# Patient Record
Sex: FEMALE | Race: WHITE | NOT HISPANIC OR LATINO | Employment: OTHER | URBAN - METROPOLITAN AREA
[De-identification: names, ages, dates, MRNs, and addresses within clinical notes are randomized per-mention and may not be internally consistent; named-entity substitution may affect disease eponyms.]

---

## 2017-01-20 ENCOUNTER — HOSPITAL ENCOUNTER (OUTPATIENT)
Dept: RADIOLOGY | Facility: MEDICAL CENTER | Age: 62
End: 2017-01-20
Attending: OBSTETRICS & GYNECOLOGY
Payer: COMMERCIAL

## 2017-01-20 DIAGNOSIS — R92.8 ABNORMAL FINDING ON BREAST IMAGING: ICD-10-CM

## 2017-01-20 PROCEDURE — 76642 ULTRASOUND BREAST LIMITED: CPT | Mod: LT

## 2018-01-25 ENCOUNTER — TELEPHONE (OUTPATIENT)
Dept: RADIOLOGY | Facility: MEDICAL CENTER | Age: 63
End: 2018-01-25

## 2018-01-25 NOTE — TELEPHONE ENCOUNTER
SPOKE W/ PT REMINDING PT OF 2 APPTS W/ RBC ON 1/26/18 @ 2:05 & PT WAS TOLD WE HAVE TRIED TO GET SONOCINE ORDER FOR MD BUT WE HAVE NOT RECEIVED THE ORDER. PT STATES SHE WILL TRY TO CALL TO GET ORDER.

## 2018-01-26 ENCOUNTER — HOSPITAL ENCOUNTER (OUTPATIENT)
Dept: RADIOLOGY | Facility: MEDICAL CENTER | Age: 63
End: 2018-01-26
Attending: OBSTETRICS & GYNECOLOGY
Payer: COMMERCIAL

## 2018-01-26 DIAGNOSIS — R92.30 DENSE BREAST TISSUE: ICD-10-CM

## 2018-01-26 DIAGNOSIS — Z12.31 SCREENING MAMMOGRAM, ENCOUNTER FOR: ICD-10-CM

## 2018-01-26 PROCEDURE — 76641 ULTRASOUND BREAST COMPLETE: CPT

## 2018-01-26 PROCEDURE — 77067 SCR MAMMO BI INCL CAD: CPT

## 2018-08-17 ENCOUNTER — OFFICE VISIT (OUTPATIENT)
Dept: MEDICAL GROUP | Facility: MEDICAL CENTER | Age: 63
End: 2018-08-17
Payer: COMMERCIAL

## 2018-08-17 VITALS
BODY MASS INDEX: 19.63 KG/M2 | DIASTOLIC BLOOD PRESSURE: 54 MMHG | WEIGHT: 104 LBS | HEIGHT: 61 IN | HEART RATE: 71 BPM | OXYGEN SATURATION: 97 % | RESPIRATION RATE: 18 BRPM | SYSTOLIC BLOOD PRESSURE: 94 MMHG | TEMPERATURE: 99.1 F

## 2018-08-17 DIAGNOSIS — Z79.890 HORMONE REPLACEMENT THERAPY (HRT): ICD-10-CM

## 2018-08-17 DIAGNOSIS — Z00.00 ANNUAL PHYSICAL EXAM: ICD-10-CM

## 2018-08-17 DIAGNOSIS — M85.852 OSTEOPENIA OF NECKS OF BOTH FEMURS: ICD-10-CM

## 2018-08-17 DIAGNOSIS — Z00.00 PREVENTATIVE HEALTH CARE: ICD-10-CM

## 2018-08-17 DIAGNOSIS — Z80.41 FAMILY HISTORY OF OVARIAN CANCER: ICD-10-CM

## 2018-08-17 DIAGNOSIS — F33.9 EPISODE OF RECURRENT MAJOR DEPRESSIVE DISORDER, UNSPECIFIED DEPRESSION EPISODE SEVERITY (HCC): ICD-10-CM

## 2018-08-17 DIAGNOSIS — R23.2 HOT FLASHES: ICD-10-CM

## 2018-08-17 DIAGNOSIS — M85.851 OSTEOPENIA OF NECKS OF BOTH FEMURS: ICD-10-CM

## 2018-08-17 DIAGNOSIS — M54.2 NECK PAIN: ICD-10-CM

## 2018-08-17 DIAGNOSIS — H40.1130 PRIMARY OPEN ANGLE GLAUCOMA OF BOTH EYES, UNSPECIFIED GLAUCOMA STAGE: ICD-10-CM

## 2018-08-17 PROBLEM — H40.10X0 OPEN-ANGLE GLAUCOMA: Status: ACTIVE | Noted: 2018-08-17

## 2018-08-17 PROCEDURE — 99204 OFFICE O/P NEW MOD 45 MIN: CPT | Mod: 25 | Performed by: FAMILY MEDICINE

## 2018-08-17 RX ORDER — LATANOPROST 50 UG/ML
1 SOLUTION/ DROPS OPHTHALMIC DAILY
Qty: 1 BOTTLE | Refills: 11 | Status: SHIPPED | DISCHARGE
Start: 2018-08-17 | End: 2020-09-30

## 2018-08-17 ASSESSMENT — PATIENT HEALTH QUESTIONNAIRE - PHQ9: CLINICAL INTERPRETATION OF PHQ2 SCORE: 0

## 2018-08-17 NOTE — LETTER
[facility], later [facility]  Jenny Blake M.D.  [address]  [place] [address]  Fax: [phone]   Authorization for Release/Disclosure of   Protected Health Information   Name: ROSA WALKER : 1955 SSN: [id number]   Address: [address] [place] [address]  [place] [address] Phone:    [phone] (home) [phone] (work)   I authorize the entity listed below to release/disclose the PHI below to:   [facility]/Jenny Blake M.D. and Jenny Blake M.D.   Provider or Entity Name:     Address   City, State, Zip   Phone:    Fax:   Reason for request: continuity of care   Information to be released:    [  ] LAST COLONOSCOPY,  including any PATH REPORT and follow-up  [  ] LAST FIT/COLOGUARD RESULT [  ] LAST DEXA  [  ] LAST MAMMOGRAM  [  ] LAST PAP  [  ] LAST LABS [  ] RETINA EXAM REPORT  [  ] IMMUNIZATION RECORDS  [  ] Release all info      [  ] Check here and initial the line next to each item to release ALL health information INCLUDING  _____ Care and treatment for drug and / or alcohol abuse  _____ HIV testing, infection status, or AIDS  _____ Genetic Testing    DATES OF SERVICE OR TIME PERIOD TO BE DISCLOSED: _____________  I understand and acknowledge that:  * This Authorization may be revoked at any time by you in writing, except if your health information has already been used or disclosed.  * Your health information that will be used or disclosed as a result of you signing this authorization could be re-disclosed by the recipient. If this occurs, your re-disclosed health information may no longer be protected by State or Federal laws.  * You may refuse to sign this Authorization. Your refusal will not affect your ability to obtain treatment.  * This Authorization becomes effective upon signing and will  on (date) __________.      If no date is indicated, this Authorization will  one (1) year from the signature date.    Name: Rosa Walker    Signature:   Date:      8/17/2018       PLEASE FAX REQUESTED RECORDS BACK TO: [phone]

## 2018-08-17 NOTE — ASSESSMENT & PLAN NOTE
Preventative health care counseling discussed   up to date mammo colonoscopy dexa   Will do labs too

## 2018-08-17 NOTE — PROGRESS NOTES
This medical record contains text that has been entered with the assistance of computer voice recognition and dictation software.  Therefore, it may contain unintended errors in text, spelling, punctuation, or grammar        Chief Complaint   Patient presents with   • Establish Care   • Depression     Refill on medications       Rosa Walker is a 63 y.o. female here evaluation and management of: est care annual physical and labs as well as some long term issues, chronic neck pain well controlled no radiculopathy, depression well controled trintellex for 1-2 years, HRT well controlled on HRT and glaucoma well controlled on latanoprost and lastly family history ovarian cancer mom and questions        Current Outpatient Prescriptions   Medication Sig Dispense Refill   • Vortioxetine HBr (TRINTELLIX) 10 MG Tab Take 1 Tab by mouth every day. 90 Tab 3   • latanoprost (XALATAN) 0.005 % Solution Place 1 Drop in both eyes every day. 1 Bottle 11   • estradiol-levonorgestrel (CLIMARAPRO) 0.045-0.015 MG/DAY patch Apply 1 Patch to skin as directed every 7 days. 12 Patch 3     No current facility-administered medications for this visit.      Patient Active Problem List    Diagnosis Date Noted   • Open-angle glaucoma 08/17/2018   • Osteopenia of necks of both femurs 08/17/2018   • Annual physical exam 08/17/2018   • Neck pain 10/02/2014   • Family history of ovarian cancer 10/02/2014   • Depression 07/16/2013   • Hot flashes 07/16/2013     Past Surgical History:   Procedure Laterality Date   • CATARACT PHACO WITH IOL  5/7/2012    Performed by GENO ARREDONDO at [facility] [place] [facility]   • CATARACT PHACO WITH IOL  4/16/2012    Performed by GENO ARREDONDO at [facility] [place] [place] [facility]   • KNEE ARTHROSCOPY  9/9/2010    Performed by FLORI CHILDRESS at [facility] ORS   • MENISCECTOMY  9/9/2010    Performed by FLORI CHILDRESS at [facility] ORS   • OTHER SURGICAL PROCEDURE  6/2005    Full thickness  "skin graft left politeal fossa   • RETINAL DETACHMENT REPAIR  3/1992    left    • EYE SURGERY        Social History   Substance Use Topics   • Smoking status: Former Smoker     Packs/day: 0.50     Years: 10.00     Types: Cigarettes     Quit date: 1/1/1985   • Smokeless tobacco: Never Used   • Alcohol use 3.5 oz/week     7 Glasses of wine per week     Family History   Problem Relation Age of Onset   • Cancer Mother 74        ovarian   • Other Father         sepsis           ROS  No n/v  all review of system completed and negative except for those listed above     Objective:     Blood pressure (!) 94/54, pulse 71, temperature 37.3 °C (99.1 °F), resp. rate 18, height 1.549 m (5' 1\"), weight 47.2 kg (104 lb), SpO2 97 %. Body mass index is 19.65 kg/m².  Physical Exam:    Constitutional: Alert, no distress.  Skin: Warm, dry, good turgor, no rashes in visible areas.  Eye: Equal, round and reactive, conjunctiva clear, lids normal.  ENMT: Lips without lesions, good dentition, oropharynx clear.  Neck: Trachea midline, no masses, no thyromegaly. No cervical or supraclavicular lymphadenopathy.  Respiratory: Unlabored respiratory effort, lungs clear to auscultation, no wheezes, no ronchi.  Cardiovascular: Normal S1, S2, no murmur, no edema.  Abdomen: Soft, non-tender, no masses, no hepatosplenomegaly.  Psych: Alert and oriented x3, normal affect and mood.              Assessment and Plan:   The following treatment plan was discussed        Problem List Items Addressed This Visit     Depression    Relevant Medications    Vortioxetine HBr (TRINTELLIX) 10 MG Tab    Hot flashes     Well controlled on climarapro patch daily   Risk benefit side effect discussed                  Neck pain     Has had injections in the past but doing well currently       Of note improved after starting trintellix             Family history of ovarian cancer     Did the BRACA testing negative   I discuss COLOR  I also give her a genetic screening " radha and offer to refer to genetic counselor               Open-angle glaucoma     Has opthalmologist Dr. AMIN also takes xalatan             Relevant Medications    latanoprost (XALATAN) 0.005 % Solution    Osteopenia of necks of both femurs     She is on HRT  She states her T score was >-1.5 so not quite a candidate for fossamax               Annual physical exam     Preventative health care counseling discussed   up to date mammo colonoscopy dexa   Will do labs too                 Other Visit Diagnoses     Hormone replacement therapy (HRT)        Relevant Medications    estradiol-levonorgestrel (CLIMARAPRO) 0.045-0.015 MG/DAY patch                Instructed to follow up if symptoms worsen or fail to improve, ER/UC precautions discussed as well    Jenny Blake MD  [facility], Family Medicine   [address] [place] [address]   [place] [address]  Phone: [phone]

## 2018-08-17 NOTE — ASSESSMENT & PLAN NOTE
Did the BRACA testing negative   I discuss COLOR  I also give her a genetic screening questionairre and offer to refer to genetic counselor

## 2018-11-26 DIAGNOSIS — Z79.890 HORMONE REPLACEMENT THERAPY (HRT): ICD-10-CM

## 2018-11-26 DIAGNOSIS — F33.9 EPISODE OF RECURRENT MAJOR DEPRESSIVE DISORDER, UNSPECIFIED DEPRESSION EPISODE SEVERITY (HCC): ICD-10-CM

## 2019-02-06 ENCOUNTER — TELEPHONE (OUTPATIENT)
Dept: MEDICAL GROUP | Facility: MEDICAL CENTER | Age: 64
End: 2019-02-06

## 2019-02-06 NOTE — TELEPHONE ENCOUNTER
FINAL PRIOR AUTHORIZATION STATUS:    1.  Name of Medication & Dose: CLIMARA PRO 0.045-0.015 MG      2. Prior Auth Status: Approved through 02/06/2020     3. Action Taken: Pharmacy Notified: yes Patient Notified: N\A

## 2019-03-20 ENCOUNTER — HOSPITAL ENCOUNTER (OUTPATIENT)
Dept: RADIOLOGY | Facility: MEDICAL CENTER | Age: 64
End: 2019-03-20
Attending: OBSTETRICS & GYNECOLOGY
Payer: COMMERCIAL

## 2019-03-20 DIAGNOSIS — R92.30 DENSE BREAST TISSUE: ICD-10-CM

## 2019-03-20 DIAGNOSIS — Z12.31 VISIT FOR SCREENING MAMMOGRAM: ICD-10-CM

## 2019-03-20 PROCEDURE — 77063 BREAST TOMOSYNTHESIS BI: CPT

## 2019-03-20 PROCEDURE — 76641 ULTRASOUND BREAST COMPLETE: CPT

## 2019-08-20 DIAGNOSIS — F33.9 EPISODE OF RECURRENT MAJOR DEPRESSIVE DISORDER, UNSPECIFIED DEPRESSION EPISODE SEVERITY (HCC): ICD-10-CM

## 2019-08-21 ENCOUNTER — TELEPHONE (OUTPATIENT)
Dept: MEDICAL GROUP | Facility: MEDICAL CENTER | Age: 64
End: 2019-08-21

## 2019-08-21 RX ORDER — VORTIOXETINE 10 MG/1
TABLET, FILM COATED ORAL
Qty: 90 TAB | Refills: 0 | Status: SHIPPED | OUTPATIENT
Start: 2019-08-21 | End: 2020-09-30

## 2019-08-21 NOTE — TELEPHONE ENCOUNTER
MEDICATION PRIOR AUTHORIZATION NEEDED:    1. Name of Medication: Trintellix 10 mg    2. Requested By (Name of Pharmacy): [facility]     3. Is insurance on file current? Yes    4. What is the name & phone number of the 3rd party payor? [phone]    PAR started 08/21/2019

## 2019-08-30 NOTE — TELEPHONE ENCOUNTER
FINAL PRIOR AUTHORIZATION STATUS:    1.  Name of Medication & Dose: Trintellix 10 mg tab     2. Prior Auth Status: Approved through 08/20/2020     3. Action Taken: Pharmacy Notified: yes Patient Notified: N\A

## 2019-12-24 ENCOUNTER — APPOINTMENT (RX ONLY)
Dept: URBAN - METROPOLITAN AREA CLINIC 22 | Facility: CLINIC | Age: 64
Setting detail: DERMATOLOGY
End: 2019-12-24

## 2019-12-24 DIAGNOSIS — L57.0 ACTINIC KERATOSIS: ICD-10-CM

## 2019-12-24 PROBLEM — D48.5 NEOPLASM OF UNCERTAIN BEHAVIOR OF SKIN: Status: ACTIVE | Noted: 2019-12-24

## 2019-12-24 PROCEDURE — 17003 DESTRUCT PREMALG LES 2-14: CPT

## 2019-12-24 PROCEDURE — 17000 DESTRUCT PREMALG LESION: CPT | Mod: 59

## 2019-12-24 PROCEDURE — 11102 TANGNTL BX SKIN SINGLE LES: CPT

## 2019-12-24 PROCEDURE — ? BIOPSY BY SHAVE METHOD

## 2019-12-24 PROCEDURE — ? LIQUID NITROGEN

## 2019-12-24 ASSESSMENT — LOCATION DETAILED DESCRIPTION DERM
LOCATION DETAILED: LEFT MEDIAL SUPERIOR CHEST
LOCATION DETAILED: LEFT INFERIOR LATERAL FOREHEAD
LOCATION DETAILED: LEFT CENTRAL FRONTAL SCALP
LOCATION DETAILED: RIGHT MEDIAL SUPERIOR CHEST
LOCATION DETAILED: RIGHT CENTRAL FRONTAL SCALP

## 2019-12-24 ASSESSMENT — LOCATION ZONE DERM
LOCATION ZONE: FACE
LOCATION ZONE: SCALP
LOCATION ZONE: TRUNK

## 2019-12-24 ASSESSMENT — LOCATION SIMPLE DESCRIPTION DERM
LOCATION SIMPLE: CHEST
LOCATION SIMPLE: RIGHT SCALP
LOCATION SIMPLE: LEFT FOREHEAD
LOCATION SIMPLE: LEFT SCALP

## 2019-12-24 NOTE — PROCEDURE: BIOPSY BY SHAVE METHOD
Destruction After The Procedure: No
Size Of Lesion In Cm: 1.1
Lab: 253
Anesthesia Type: 1% lidocaine with epinephrine and a 1:10 solution of 8.4% sodium bicarbonate
Billing Type: Third-Party Bill
Cryotherapy Text: The wound bed was treated with cryotherapy after the biopsy was performed.
Depth Of Biopsy: dermis
Post-Care Instructions: I reviewed with the patient in detail post-care instructions. Patient is to keep the biopsy site dry overnight, and then apply bacitracin twice daily until healed. Patient may apply hydrogen peroxide soaks to remove any crusting.
Additional Anesthesia Volume In Cc (Will Not Render If 0): 0
Biopsy Type: H and E
Notification Instructions: Patient will be notified of biopsy results. However, patient instructed to call the office if not contacted within 2 weeks.
Lab Facility: 
Wound Care: Petrolatum
Electrodesiccation Text: The wound bed was treated with electrodesiccation after the biopsy was performed.
Consent: Written consent was obtained and risks were reviewed including but not limited to scarring, infection, bleeding, scabbing, incomplete removal, nerve damage and allergy to anesthesia.
Type Of Destruction Used: Curettage
Anesthesia Volume In Cc: 1
Dressing: Band-Aid
Electrodesiccation And Curettage Text: The wound bed was treated with electrodesiccation and curettage after the biopsy was performed.
Was A Bandage Applied: Yes
Silver Nitrate Text: The wound bed was treated with silver nitrate after the biopsy was performed.
Detail Level: Detailed
Biopsy Method: Personna blade
Hemostasis: Pressure and suture ligation
Curettage Text: The wound bed was treated with curettage after the biopsy was performed.

## 2019-12-24 NOTE — PROCEDURE: LIQUID NITROGEN
Render Post-Care Instructions In Note?: yes
Detail Level: Detailed
Duration Of Freeze Thaw-Cycle (Seconds): 5
Post-Care Instructions: I reviewed with the patient in detail post-care instructions. Patient is to wear sunprotection, and avoid picking at any of the treated lesions. Pt may apply Vaseline to crusted or scabbing areas.
Number Of Freeze-Thaw Cycles: 1 freeze-thaw cycle
Render Note In Bullet Format When Appropriate: No
Consent: The patient's consent was obtained including but not limited to risks of crusting, scabbing, blistering, scarring, darker or lighter pigmentary change, recurrence, incomplete removal and infection.

## 2020-05-12 ENCOUNTER — TELEMEDICINE (OUTPATIENT)
Dept: MEDICAL GROUP | Facility: MEDICAL CENTER | Age: 65
End: 2020-05-12
Payer: COMMERCIAL

## 2020-05-12 VITALS
HEIGHT: 66 IN | BODY MASS INDEX: 17.19 KG/M2 | WEIGHT: 107 LBS | SYSTOLIC BLOOD PRESSURE: 110 MMHG | DIASTOLIC BLOOD PRESSURE: 60 MMHG | HEART RATE: 70 BPM

## 2020-05-12 DIAGNOSIS — N95.2 ATROPHIC VAGINITIS: ICD-10-CM

## 2020-05-12 DIAGNOSIS — F32.A DEPRESSION, UNSPECIFIED DEPRESSION TYPE: ICD-10-CM

## 2020-05-12 DIAGNOSIS — Z80.41 FAMILY HISTORY OF MALIGNANT NEOPLASM OF OVARY: ICD-10-CM

## 2020-05-12 PROCEDURE — 99214 OFFICE O/P EST MOD 30 MIN: CPT | Mod: 95,CR | Performed by: FAMILY MEDICINE

## 2020-05-12 RX ORDER — PRASTERONE 6.5 MG/1
6.5 INSERT VAGINAL PRN
COMMUNITY

## 2020-05-12 RX ORDER — VORTIOXETINE 5 MG/1
1 TABLET, FILM COATED ORAL DAILY
Qty: 90 TAB | Refills: 3 | Status: SHIPPED | OUTPATIENT
Start: 2020-05-12 | End: 2021-05-22

## 2020-05-12 SDOH — HEALTH STABILITY: MENTAL HEALTH: HOW OFTEN DO YOU HAVE A DRINK CONTAINING ALCOHOL?: 4 OR MORE TIMES A WEEK

## 2020-05-12 ASSESSMENT — PATIENT HEALTH QUESTIONNAIRE - PHQ9: CLINICAL INTERPRETATION OF PHQ2 SCORE: 0

## 2020-05-12 NOTE — LETTER
[facility]  Jenny Blake M.D.  [address]  [facility] [address]  Fax: [phone]   Authorization for Release/Disclosure of   Protected Health Information   Name: WATSON PANTOJA : 1955 SSN: [id number]   Address: [address] [place] [address] Phone:    [phone] (home) [phone] (work)   I authorize the entity listed below to release/disclose the PHI below to:   [facility]/Jenny Blake M.D. and Jenny Blake M.D.   Provider or Entity Name:  [facility]   Address   [facility], Zip  [address] [place] [address] Phone:  [phone]    Fax:  [phone]   Reason for request: continuity of care   Information to be released:    [xx] LASTCOLONOSCOPY,  including any PATH REPORT and follow-up  [  ] LAST FIT/COLOGUARD RESULT [  ] LAST DEXA  [  ] LAST MAMMOGRAM  [  ] LAST PAP  [  ] LAST LABS [  ] RETINA EXAM REPORT  [  ] IMMUNIZATION RECORDS  [  ] Release all info      [  ] Check here and initial the line next to each item to release ALL health information INCLUDING  _____ Care and treatment for drug and / or alcohol abuse  _____ HIV testing, infection status, or AIDS  _____ Genetic Testing    DATES OF SERVICE OR TIME PERIOD TO BE DISCLOSED: _____________  I understand and acknowledge that:  * This Authorization may be revoked at any time by you in writing, except if your health information has already been used or disclosed.  * Your health information that will be used or disclosed as a result of you signing this authorization could be re-disclosed by the recipient. If this occurs, your re-disclosed health information may no longer be protected by State or Federal laws.  * You may refuse to sign this Authorization. Your refusal will not affect your ability to obtain treatment.  * This Authorization becomes effective upon signing and will  on (date) __________.      If no date is indicated, this Authorization will  one (1) year from the signature  date.    Name: Rosa Walker    Signature: Continuation of Care   Date:     5/12/2020       PLEASE FAX REQUESTED RECORDS BACK TO: [phone]

## 2020-05-13 NOTE — ASSESSMENT & PLAN NOTE
Her mother passed from ovarian cancer when Rosa was 48 and had 5 kids and it was very difficult for her   She did genetic screening   Did baseline pelvic US   Does annual pelvic exam non pap

## 2020-05-13 NOTE — ASSESSMENT & PLAN NOTE
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

## 2020-05-13 NOTE — PROGRESS NOTES
Telemedicine Visit: Established Patient     This encounter was conducted via Zoom .   Verbal consent was obtained. Patient's identity was verified.    Subjective:   CC: follow up depression     Rosa Walker is a 64 y.o. female presenting for evaluation and management of:    Follow up depression   Refill trintillex   Would like to go down to 5mg     ROS   Denies any recent fevers or chills. No nausea or vomiting. No chest pains or shortness of breath.     Allergies   Allergen Reactions   • Nkda [No Known Drug Allergy]        Current medicines (including changes today)  Current Outpatient Medications   Medication Sig Dispense Refill   • Prasterone (INTRAROSA) 6.5 MG INSERT Insert 6.5 mg in vagina.     • Vortioxetine HBr (TRINTELLIX) 5 MG Tab Take 1 Tab by mouth every day. 90 Tab 3   • TRINTELLIX 10 MG Tab TAKE ONE TABLET BY MOUTH EVERY DAY 90 Tab 0   • estradiol-levonorgestrel (CLIMARAPRO) 0.045-0.015 MG/DAY patch Apply 1 Patch to skin as directed every 7 days. 12 Patch 3   • latanoprost (XALATAN) 0.005 % Solution Place 1 Drop in both eyes every day. 1 Bottle 11   • Vortioxetine HBr (TRINTELLIX) 10 MG Tab Take 1 Tab by mouth every day. (Patient not taking: Reported on 5/12/2020) 90 Tab 3     No current facility-administered medications for this visit.        Patient Active Problem List    Diagnosis Date Noted   • Atrophic vaginitis 05/12/2020   • Open-angle glaucoma 08/17/2018   • Osteopenia of necks of both femurs 08/17/2018   • Annual physical exam 08/17/2018   • Neck pain 10/02/2014   • Family history of ovarian cancer 10/02/2014   • Depression 07/16/2013   • Hot flashes 07/16/2013       Family History   Problem Relation Age of Onset   • Cancer Mother 74        ovarian   • Other Father         sepsis       She  has a past medical history of Arthritis, Depression (7/16/2013), Hot flashes (7/16/2013), and Psychiatric problem.  She  has a past surgical history that includes retinal detachment repair  "(3/1992); other surgical procedure (6/2005); knee arthroscopy (9/9/2010); meniscectomy (9/9/2010); cataract phaco with iol (4/16/2012); cataract phaco with iol (5/7/2012); and eye surgery.       Objective:   Vitals obtained by patient:  Encounter Vitals  Standard Vitals  Vitals  Blood Pressure : 110/60(pt stated)  Pulse: 70(pt stated)  Height: 167.6 cm (5' 6\")(pt stated)  Weight: 48.5 kg (107 lb)(pt stated)  Encounter Vitals  Blood Pressure : 110/60(pt stated)  Pulse: 70(pt stated)  Weight: 48.5 kg (107 lb)(pt stated)  Height: 167.6 cm (5' 6\")(pt stated)  BMI (Calculated): 17.27  Pulmonary-Specific Vitals     Durable Medical Equipment-Specific Vitals               Physical Exam:  Constitutional: Alert, no distress, well-groomed.  Skin: No rashes in visible areas.  Eye: Round. Conjunctiva clear, lids normal. No icterus.   ENMT: Lips pink without lesions, good dentition, moist mucous membranes. Phonation normal.  Neck: No masses, no thyromegaly. Moves freely without pain.  CV: Pulse as reported by patient  Respiratory: Unlabored respiratory effort, no cough or audible wheeze  Psych: Alert and oriented x3, normal affect and mood.       Assessment and Plan:   The following treatment plan was discussed:     1. Depression, unspecified depression type  - Vortioxetine HBr (TRINTELLIX) 5 MG Tab; Take 1 Tab by mouth every day.  Dispense: 90 Tab; Refill: 3    2. Family history of ovarian cancer    3. Atrophic vaginitis    Other orders  - Prasterone (INTRAROSA) 6.5 MG INSERT; Insert 6.5 mg in vagina.  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   She has 5 children             Relevant Medications    Vortioxetine HBr (TRINTELLIX) 5 MG Tab    " Family history of ovarian cancer     Her mother passed from ovarian cancer when Rosa was 48 and had 5 kids and it was very difficult for her   She did genetic screening   Did baseline pelvic US   Does annual pelvic exam non pap            Atrophic vaginitis     Using prasterone  Doing HRT                Over 25 minutes spent with patient face to face, greater than 50% time spent with plan/coordination of care regarding that which is discussed in the HPI and A&P        Follow-up: No follow-ups on file.  Jenny Blake M.D.

## 2020-06-12 ENCOUNTER — HOSPITAL ENCOUNTER (OUTPATIENT)
Dept: RADIOLOGY | Facility: MEDICAL CENTER | Age: 65
End: 2020-06-12
Attending: OBSTETRICS & GYNECOLOGY
Payer: COMMERCIAL

## 2020-06-12 DIAGNOSIS — Z12.31 VISIT FOR SCREENING MAMMOGRAM: ICD-10-CM

## 2020-06-12 PROCEDURE — 77067 SCR MAMMO BI INCL CAD: CPT

## 2020-08-11 ENCOUNTER — TELEPHONE (OUTPATIENT)
Dept: MEDICAL GROUP | Facility: MEDICAL CENTER | Age: 65
End: 2020-08-11

## 2020-08-11 NOTE — TELEPHONE ENCOUNTER
Prior Auth for Trintellix 5mg tabs rejected. Recommended alternatives: Citalopram Hydrobromide, Fluoxetine HCl, Sertraline HCl, Venlafaxine HCl ER, Escitalopram Oxalate, Paroxetine HCl, Duloxetine HCl.    Please advise

## 2020-09-30 ENCOUNTER — OFFICE VISIT (OUTPATIENT)
Dept: MEDICAL GROUP | Facility: MEDICAL CENTER | Age: 65
End: 2020-09-30
Payer: COMMERCIAL

## 2020-09-30 VITALS
BODY MASS INDEX: 16.65 KG/M2 | WEIGHT: 103.62 LBS | HEART RATE: 60 BPM | TEMPERATURE: 97.8 F | SYSTOLIC BLOOD PRESSURE: 100 MMHG | HEIGHT: 66 IN | RESPIRATION RATE: 16 BRPM | DIASTOLIC BLOOD PRESSURE: 50 MMHG | OXYGEN SATURATION: 93 %

## 2020-09-30 DIAGNOSIS — G43.809 OTHER MIGRAINE WITHOUT STATUS MIGRAINOSUS, NOT INTRACTABLE: ICD-10-CM

## 2020-09-30 PROBLEM — G43.909 MIGRAINE: Status: ACTIVE | Noted: 2020-09-30

## 2020-09-30 PROCEDURE — 99214 OFFICE O/P EST MOD 30 MIN: CPT | Performed by: FAMILY MEDICINE

## 2020-09-30 RX ORDER — SUMATRIPTAN 100 MG/1
100 TABLET, FILM COATED ORAL
Qty: 10 TAB | Refills: 3 | Status: SHIPPED
Start: 2020-09-30 | End: 2020-11-12

## 2020-09-30 ASSESSMENT — PATIENT HEALTH QUESTIONNAIRE - PHQ9
7. TROUBLE CONCENTRATING ON THINGS, SUCH AS READING THE NEWSPAPER OR WATCHING TELEVISION: NOT AT ALL
6. FEELING BAD ABOUT YOURSELF - OR THAT YOU ARE A FAILURE OR HAVE LET YOURSELF OR YOUR FAMILY DOWN: NOT AL ALL
2. FEELING DOWN, DEPRESSED, IRRITABLE, OR HOPELESS: NOT AT ALL
1. LITTLE INTEREST OR PLEASURE IN DOING THINGS: NOT AT ALL
8. MOVING OR SPEAKING SO SLOWLY THAT OTHER PEOPLE COULD HAVE NOTICED. OR THE OPPOSITE, BEING SO FIGETY OR RESTLESS THAT YOU HAVE BEEN MOVING AROUND A LOT MORE THAN USUAL: NOT AT ALL
5. POOR APPETITE OR OVEREATING: NOT AT ALL
SUM OF ALL RESPONSES TO PHQ QUESTIONS 1-9: 0
3. TROUBLE FALLING OR STAYING ASLEEP OR SLEEPING TOO MUCH: NOT AT ALL
9. THOUGHTS THAT YOU WOULD BE BETTER OFF DEAD, OR OF HURTING YOURSELF: NOT AT ALL
SUM OF ALL RESPONSES TO PHQ9 QUESTIONS 1 AND 2: 0
4. FEELING TIRED OR HAVING LITTLE ENERGY: NOT AT ALL

## 2020-09-30 NOTE — PROGRESS NOTES
"This medical record contains text that has been entered with the assistance of computer voice recognition and dictation software.  Therefore, it may contain unintended errors in text, spelling, punctuation, or grammar        Chief Complaint   Patient presents with   • Migraine     chronic, pt tried various meds       Rosa Walker is a 65 y.o. female here evaluation and management of:    New to me issue chronic migraines interested in rx for aimovig    She is RN at [employer]     Of note she is not wearing her mask when I enter the room, I ask her to replace mask consistent with our office policy and she does so but explains \"at [employer] we can take our masks off \"    Current Outpatient Medications   Medication Sig Dispense Refill   • sumatriptan (IMITREX) 100 MG tablet Take 1 Tab by mouth Once PRN for Migraine for up to 1 dose. 10 Tab 3   • Prasterone (INTRAROSA) 6.5 MG INSERT Insert 6.5 mg in vagina.     • Vortioxetine HBr (TRINTELLIX) 5 MG Tab Take 1 Tab by mouth every day. 90 Tab 3   • estradiol-levonorgestrel (CLIMARAPRO) 0.045-0.015 MG/DAY patch Apply 1 Patch to skin as directed every 7 days. 12 Patch 3     No current facility-administered medications for this visit.      Patient Active Problem List    Diagnosis Date Noted   • Migraine 09/30/2020   • Atrophic vaginitis 05/12/2020   • Open-angle glaucoma 08/17/2018   • Osteopenia of necks of both femurs 08/17/2018   • Annual physical exam 08/17/2018   • Neck pain 10/02/2014   • Family history of ovarian cancer 10/02/2014   • Depression 07/16/2013   • Hot flashes 07/16/2013     Past Surgical History:   Procedure Laterality Date   • CATARACT PHACO WITH IOL  5/7/2012    Performed by GENO ARREDONDO at [facility]ERY [facility]   • CATARACT PHACO WITH IOL  4/16/2012    Performed by GENO ARREDONDO at [facility]   • KNEE ARTHROSCOPY  9/9/2010    Performed by FLORI CHILDRESS at [facility]S   • MENISCECTOMY  9/9/2010    " "Performed by FLORI CHILDRESS at SURGERY [facility] ORS   • OTHER SURGICAL PROCEDURE  2005    Full thickness skin graft left politeal fossa   • RETINAL DETACHMENT REPAIR  3/1992    left    • EYE SURGERY        Social History     Tobacco Use   • Smoking status: Former Smoker     Packs/day: 0.50     Years: 10.00     Pack years: 5.00     Types: Cigarettes     Quit date: 1985     Years since quittin.7   • Smokeless tobacco: Never Used   Substance Use Topics   • Alcohol use: Yes     Alcohol/week: 3.0 oz     Types: 5 Glasses of wine per week     Frequency: 4 or more times a week     Comment: 5 glass wine per week   • Drug use: No     Family History   Problem Relation Age of Onset   • Cancer Mother 74        ovarian   • Other Father         sepsis           ROS  No n/v  all review of system completed and negative except for those listed above     Objective:     /50 (BP Location: Left arm, Patient Position: Sitting, BP Cuff Size: Adult)   Pulse 60   Temp 36.6 °C (97.8 °F) (Temporal)   Resp 16   Ht 1.676 m (5' 6\")   Wt 47 kg (103 lb 9.9 oz)   SpO2 93%  Body mass index is 16.72 kg/m².  Physical Exam:        GEN: comfortable, alert and oriented, well nourished, well developed, in no apparent distress   HEENT: NCAT, eyes: pupils equal and reactive, sclera white, EOMIT, good dentition  HEART: limbs warm and well perfused, regular rate, no JVD, no lower extremity edema  LUNGS: speaking in full sentences, not in apparent respiratory distress, no audible wheezes  MSK: normal tone and bulk, no swelling of the joints, gait steady and normal         Assessment and Plan:   The following treatment plan was discussed        Problem List Items Addressed This Visit     Migraine     This is a new issue to me   She states that she started having migraines about 20 years ago at ae 45 premenopausal but after childbirth   Describes as frontal unilateral headache with nausea  3-4 per month on average, 6+ during a bad " "month, will last 3 days   whats been concerning to her is that past couple of months \"perhaps smoke is trigger\" she has been having 6 + per month, and associated with vision change.  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[employer])       1) I offer brain imaging (mri) as she was atypical age for presentation of migraine and having new worsening features currently - she declines   2) I offer ppx medicatoin such as bb, TCA, topimax she declines  - feels that she would not enjoy side effect   3) I  her that while HRT is not a contraindication to migraines, the exogenous estrogen may be worsening her severity and frequency and she may observe migraine improvement in the absence of HRT  - she is not interested in weaning at this point     She is asking if I am comfortable rx-ing Nurtec or aimovig, she has experimented with samples and has found these to be beneficial.  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  Instead I offer neurology consultation.  I also offer rx for imitrex for abortive therapy       Over 25 minutes spent with patient face to face, greater than 50% time spent with plan/coordination of care regarding that which is discussed in the HPI and A&P           Relevant Medications    sumatriptan (IMITREX) 100 MG tablet    Other Relevant Orders    REFERRAL TO NEUROLOGY                Instructed to follow up if symptoms worsen or fail to improve, ER/UC precautions discussed as well    Jenny Blake MD  [facility], Family Medicine   [address] [place] [address] [address]ky   [place] [address]  Phone: [phone]               "

## 2020-09-30 NOTE — ASSESSMENT & PLAN NOTE
"This is a new issue to me   She states that she started having migraines about 20 years ago at ae 45 premenopausal but after childbirth   Describes as frontal unilateral headache with nausea  3-4 per month on average, 6+ during a bad month, will last 3 days   whats been concerning to her is that past couple of months \"perhaps smoke is trigger\" she has been having 6 + per month, and associated with vision change.  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[employer])       1) I offer brain imaging (mri) as she was atypical age for presentation of migraine and having new worsening features currently - she declines   2) I offer ppx medicatoin such as bb, TCA, topimax she declines  - feels that she would not enjoy side effect   3) I  her that while HRT is not a contraindication to migraines, the exogenous estrogen may be worsening her severity and frequency and she may observe migraine improvement in the absence of HRT  - she is not interested in weaning at this point     She is asking if I am comfortable rx-ing Nurtec or aimovig, she has experimented with samples and has found these to be beneficial.  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  Instead I offer neurology consultation.  I also offer rx for imitrex for abortive therapy       Over 25 minutes spent with patient face to face, greater than 50% time spent with plan/coordination of care regarding that which is discussed in the HPI and A&P    "

## 2020-11-12 ENCOUNTER — OFFICE VISIT (OUTPATIENT)
Dept: NEUROLOGY | Facility: MEDICAL CENTER | Age: 65
End: 2020-11-12
Payer: COMMERCIAL

## 2020-11-12 VITALS
HEIGHT: 66 IN | BODY MASS INDEX: 17.18 KG/M2 | HEART RATE: 75 BPM | TEMPERATURE: 98.7 F | WEIGHT: 106.9 LBS | RESPIRATION RATE: 12 BRPM | SYSTOLIC BLOOD PRESSURE: 98 MMHG | DIASTOLIC BLOOD PRESSURE: 54 MMHG | OXYGEN SATURATION: 97 %

## 2020-11-12 DIAGNOSIS — G43.109 OCULAR MIGRAINE: ICD-10-CM

## 2020-11-12 PROCEDURE — 99204 OFFICE O/P NEW MOD 45 MIN: CPT | Performed by: NURSE PRACTITIONER

## 2020-11-12 RX ORDER — GABAPENTIN 100 MG/1
CAPSULE ORAL
COMMUNITY
Start: 2020-09-01 | End: 2022-05-06

## 2020-11-12 ASSESSMENT — ENCOUNTER SYMPTOMS
HEADACHES: 1
SHORTNESS OF BREATH: 0
FEVER: 1
COUGH: 0
CHILLS: 1
PALPITATIONS: 0
DEPRESSION: 1
BLURRED VISION: 1
NERVOUS/ANXIOUS: 0
HEARTBURN: 0
VOMITING: 0
BRUISES/BLEEDS EASILY: 1
NAUSEA: 0
MYALGIAS: 0

## 2020-11-12 NOTE — PATIENT INSTRUCTIONS
Start magnesium oxide 400mg gel cap, by mouth every night, may take extra dose if needed for headache (over the counter), hold for diarrhea         Start Riboflavin (Vitamin B2) 400mg by mouth every night (over the counter),may turn urine bright yellow         Start COQ 10, take 300mg every night. (over the counter)          Attempt to go to bed and get up at the same time every night           Eat meals on regular basis            Stay hydrated.             Aerobic exercise 30 minutes daily             Avoid aged or smoked foods, avoid processed foods, red wine, aged cheese              Keep headache diary, include foods that you may have eaten.             Avoid overusing over the counter medications:  Do not take more than 500mg acetaminophen (tylenol), more than 4 times weekly, more frequent or larger doses are associated with medication overuse headache.            Migraine Headache  A migraine headache is a very strong throbbing pain on one side or both sides of your head. This type of headache can also cause other symptoms. It can last from 4 hours to 3 days. Talk with your doctor about what things may bring on (trigger) this condition.  What are the causes?  The exact cause of this condition is not known. This condition may be triggered or caused by:  · Drinking alcohol.  · Smoking.  · Taking medicines, such as:  ? Medicine used to treat chest pain (nitroglycerin).  ? Birth control pills.  ? Estrogen.  ? Some blood pressure medicines.  · Eating or drinking certain products.  · Doing physical activity.  Other things that may trigger a migraine headache include:  · Having a menstrual period.  · Pregnancy.  · Hunger.  · Stress.  · Not getting enough sleep or getting too much sleep.  · Weather changes.  · Tiredness (fatigue).  What increases the risk?  · Being 25-55 years old.  · Being female.  · Having a family history of migraine headaches.  · Being .  · Having depression or anxiety.  · Being very  overweight.  What are the signs or symptoms?  · A throbbing pain. This pain may:  ? Happen in any area of the head, such as on one side or both sides.  ? Make it hard to do daily activities.  ? Get worse with physical activity.  ? Get worse around bright lights or loud noises.  · Other symptoms may include:  ? Feeling sick to your stomach (nauseous).  ? Vomiting.  ? Dizziness.  ? Being sensitive to bright lights, loud noises, or smells.  · Before you get a migraine headache, you may get warning signs (an aura). An aura may include:  ? Seeing flashing lights or having blind spots.  ? Seeing bright spots, halos, or zigzag lines.  ? Having tunnel vision or blurred vision.  ? Having numbness or a tingling feeling.  ? Having trouble talking.  ? Having weak muscles.  · Some people have symptoms after a migraine headache (postdromal phase), such as:  ? Tiredness.  ? Trouble thinking (concentrating).  How is this treated?  · Taking medicines that:  ? Relieve pain.  ? Relieve the feeling of being sick to your stomach.  ? Prevent migraine headaches.  · Treatment may also include:  ? Having acupuncture.  ? Avoiding foods that bring on migraine headaches.  ? Learning ways to control your body functions (biofeedback).  ? Therapy to help you know and deal with negative thoughts (cognitive behavioral therapy).  Follow these instructions at home:  Medicines  · Take over-the-counter and prescription medicines only as told by your doctor.  · Ask your doctor if the medicine prescribed to you:  ? Requires you to avoid driving or using heavy machinery.  ? Can cause trouble pooping (constipation). You may need to take these steps to prevent or treat trouble pooping:  § Drink enough fluid to keep your pee (urine) pale yellow.  § Take over-the-counter or prescription medicines.  § Eat foods that are high in fiber. These include beans, whole grains, and fresh fruits and vegetables.  § Limit foods that are high in fat and sugar. These  include fried or sweet foods.  Lifestyle  · Do not drink alcohol.  · Do not use any products that contain nicotine or tobacco, such as cigarettes, e-cigarettes, and chewing tobacco. If you need help quitting, ask your doctor.  · Get at least 8 hours of sleep every night.  · Limit and deal with stress.  General instructions         · Keep a journal to find out what may bring on your migraine headaches. For example, write down:  ? What you eat and drink.  ? How much sleep you get.  ? Any change in what you eat or drink.  ? Any change in your medicines.  · If you have a migraine headache:  ? Avoid things that make your symptoms worse, such as bright lights.  ? It may help to lie down in a dark, quiet room.  ? Do not drive or use heavy machinery.  ? Ask your doctor what activities are safe for you.  · Keep all follow-up visits as told by your doctor. This is important.  Contact a doctor if:  · You get a migraine headache that is different or worse than others you have had.  · You have more than 15 headache days in one month.  Get help right away if:  · Your migraine headache gets very bad.  · Your migraine headache lasts longer than 72 hours.  · You have a fever.  · You have a stiff neck.  · You have trouble seeing.  · Your muscles feel weak or like you cannot control them.  · You start to lose your balance a lot.  · You start to have trouble walking.  · You pass out (faint).  · You have a seizure.  Summary  · A migraine headache is a very strong throbbing pain on one side or both sides of your head. These headaches can also cause other symptoms.  · This condition may be treated with medicines and changes to your lifestyle.  · Keep a journal to find out what may bring on your migraine headaches.  · Contact a doctor if you get a migraine headache that is different or worse than others you have had.  · Contact your doctor if you have more than 15 headache days in a month.  This information is not intended to replace  advice given to you by your health care provider. Make sure you discuss any questions you have with your health care provider.  Document Released: 09/26/2009 Document Revised: 04/10/2020 Document Reviewed: 01/30/2020  Elsevier Patient Education © 2020 [employer]

## 2020-11-12 NOTE — PROGRESS NOTES
"Subjective:    HPI  Rosa Walker is a 65 y.o. female who presents with chronic migraine.    She was referred by Dr. Jenny Blake    She had some headaches when she was a kid, but they really started when she was about 43 years old and was flako-menopausal.  In the past year she started getting ocular migraine, these are new. She is interested in monthly Aimovig. She is not getting painful migraines, she has not had one in about 18 months.   She had a MRI of brain and C-spine in the past,brain MRI normal.  She had some changes in C5-C6.      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       Age at Onset:  43  Triggers: none   Alleviating factors:  Nurtec has helped the ocular migraines  Meds tried and result:  She has been on neurontin for neck pain but it makes her very \"spacey\"   Imitrex gave her a dull headaches that lasted 24 hours and interfered with her eating and swallowing.   Used propranolol in the past, which caused excessive fatigue.   Hormones:  Hormone patch   Caffeine use:  2 cups of coffee daily.   OTC medications--frequency: ibuprofen 200-400mg about 5 times per week.   Smoking: none   Alcohol use:  1 glass of wine per week.   Sleep apnea: none   Family Hx:  2 brothers, father, 4/5 of her children.   How many days per month:  10-12/30  Missed days of school/work in past 6 months: none   Quality:  Ocular with kaleidoscope of black and white and areas of missing vision-pixelated vision   N&V:  Nausea with headaches not with ocular migraines.   Photo/phonophobia:   Photophobia and phonophobia when she had head pain, none with ocular migraines.   Aura: none  Prodrome:  Light sensitivity  and neck tension.        Review of Systems   Constitutional: Positive for chills and fever.   HENT: Positive for tinnitus. Negative for hearing loss.    Eyes: Positive for " "blurred vision.   Respiratory: Negative for cough and shortness of breath.    Cardiovascular: Negative for chest pain and palpitations.   Gastrointestinal: Negative for heartburn, nausea and vomiting.   Genitourinary: Negative for dysuria.   Musculoskeletal: Negative for myalgias.   Skin: Negative for rash.   Neurological: Positive for headaches.        Vertigo several years ago, none in 1 1/2 years.    Endo/Heme/Allergies: Bruises/bleeds easily.   Psychiatric/Behavioral: Positive for depression. The patient is not nervous/anxious.                Objective:     BP (!) 98/54 (BP Location: Left arm, Patient Position: Sitting, BP Cuff Size: Adult)   Pulse 75   Temp 37.1 °C (98.7 °F) (Temporal)   Resp 12   Ht 1.676 m (5' 6\")   Wt 48.5 kg (106 lb 14.4 oz)   SpO2 97%   BMI 17.25 kg/m²      Physical Exam      Constitutional:  Alert, no apparent distress,  Psych:   mood and affect WNL  Neuro:  Oriented X 4, speech fluent, naming and memory intact  Muskuloskeletal:  Moves all extremities equally, strength 5/5 bilaterally  CN II: Visual fields are full to confrontation. Fundoscopic exam is normal with sharp discs and no vascular changes. Pupils are 3 mm and briskly reactive to light.   CN III, IV, VI  EOMs intact, no ptosis  CN V: Facial sensation is intact to pinprick in all 3 divisions bilaterally. Corneal responses are intact.  CN VII: Face is symmetric with normal eye closure and smile.  CN VII: Hearing is normal to rubbing fingers  CN IX, X: Palate elevates symmetrically. Phonation is normal.  CN XI: Head turning and shoulder shrug are intact  CN XII: Tongue is midline with normal movements and no atrophy.              Strength 5/5 BUE/BLE, no drift                 Sensation to PP equal bilaterally                 No limb ataxia with finger to nose and heel to shin                 Ambulates with steady gait.                 Rhomberg negative                Biceps,brachioradialis, tricep, patellar and ankle reflex " all 2+     Cardiovascular:    S1S2, no abnormal rhythm auscultated, no peripheral edema  Neck:                     No carotid bruits noted   Pulmonary:            Respirations easy, lungs clear to auscultation all fields.     Skin:                     No obvious rashes.           Assessment/Plan:   1. Chronic migraine   Start magnesium oxide 400mg by mouth every night, may take extra dose if needed for headache (over the counter), hold for diarrhea         Start Riboflavin (Vitamin B2) 400mg by mouth every night (over the counter),may turn urine bright yellow         Start COQ 10, take 300mg every night. (over the counter)          Attempt to go to bed and get up at the same time every night           Eat meals on regular basis            Stay hydrated.             Aerobic exercise 30 minutes daily             Avoid aged or smoked foods, avoid processed foods, red wine, aged cheese              Keep headache diary, include foods that you may have eaten.             Avoid overusing over the counter medications:  Do not take more than 500mg acetaminophen (tylenol), more than 4 times weekly, more frequent or larger doses are associated with medication overuse headache.    Nurtec 75mg once daily as needed for headache    Aimovig     Would avoid Imitrex due to side effects, age and hormone use.    Avoid beta blocker, blood pressure is too low  Avoid anti-depressants, she is already on trintellix.     2. Ocular migraine      See above.        Follow up in 3 months    I counseled patient on migraine triggers, lifestyle changes, medication overuse, supplements and medication side effects.

## 2021-02-09 ENCOUNTER — OFFICE VISIT (OUTPATIENT)
Dept: NEUROLOGY | Facility: MEDICAL CENTER | Age: 66
End: 2021-02-09
Attending: NURSE PRACTITIONER
Payer: COMMERCIAL

## 2021-02-09 VITALS
BODY MASS INDEX: 17.11 KG/M2 | HEART RATE: 72 BPM | WEIGHT: 106.48 LBS | TEMPERATURE: 97.3 F | HEIGHT: 66 IN | SYSTOLIC BLOOD PRESSURE: 92 MMHG | DIASTOLIC BLOOD PRESSURE: 50 MMHG | OXYGEN SATURATION: 99 % | RESPIRATION RATE: 12 BRPM

## 2021-02-09 DIAGNOSIS — G43.109 OCULAR MIGRAINE: ICD-10-CM

## 2021-02-09 PROCEDURE — 99212 OFFICE O/P EST SF 10 MIN: CPT | Performed by: NURSE PRACTITIONER

## 2021-02-09 PROCEDURE — 99214 OFFICE O/P EST MOD 30 MIN: CPT | Performed by: NURSE PRACTITIONER

## 2021-02-09 RX ORDER — LATANOPROST 50 UG/ML
SOLUTION/ DROPS OPHTHALMIC
COMMUNITY
Start: 2021-01-05 | End: 2022-05-06

## 2021-02-09 ASSESSMENT — ENCOUNTER SYMPTOMS
DIZZINESS: 0
HEARTBURN: 0
HEADACHES: 1
TINGLING: 0
NAUSEA: 0
SENSORY CHANGE: 0
NERVOUS/ANXIOUS: 0
FEVER: 0
NECK PAIN: 0
DEPRESSION: 0
BRUISES/BLEEDS EASILY: 1
INSOMNIA: 0
COUGH: 0
VOMITING: 0
BACK PAIN: 0
BLURRED VISION: 0

## 2021-02-09 NOTE — PATIENT INSTRUCTIONS
Continue supplements as below   magnesium oxide 400mg by mouth every night, may take extra dose if needed for headache (over the counter), hold for diarrhea          Riboflavin (Vitamin B2) 400mg by mouth every night (over the counter),may turn urine bright yellow   COQ 10, take 300mg every night. (over the counter)          Attempt to go to bed and get up at the same time every night           Eat meals on regular basis            Stay hydrated.             Aerobic exercise 30 minutes daily             Avoid aged or smoked foods, avoid processed foods, red wine, aged cheese              Keep headache diary, include foods that you may have eaten.             Avoid overusing over the counter medications:  Do not take more than 500mg acetaminophen (tylenol), more than 4 times weekly, more frequent or larger doses are associated with medication overuse headache.        Migraine Headache  A migraine headache is a very strong throbbing pain on one side or both sides of your head. This type of headache can also cause other symptoms. It can last from 4 hours to 3 days. Talk with your doctor about what things may bring on (trigger) this condition.  What are the causes?  The exact cause of this condition is not known. This condition may be triggered or caused by:  · Drinking alcohol.  · Smoking.  · Taking medicines, such as:  ? Medicine used to treat chest pain (nitroglycerin).  ? Birth control pills.  ? Estrogen.  ? Some blood pressure medicines.  · Eating or drinking certain products.  · Doing physical activity.  Other things that may trigger a migraine headache include:  · Having a menstrual period.  · Pregnancy.  · Hunger.  · Stress.  · Not getting enough sleep or getting too much sleep.  · Weather changes.  · Tiredness (fatigue).  What increases the risk?  · Being 25-55 years old.  · Being female.  · Having a family history of migraine headaches.  · Being .  · Having depression or anxiety.  · Being very  overweight.  What are the signs or symptoms?  · A throbbing pain. This pain may:  ? Happen in any area of the head, such as on one side or both sides.  ? Make it hard to do daily activities.  ? Get worse with physical activity.  ? Get worse around bright lights or loud noises.  · Other symptoms may include:  ? Feeling sick to your stomach (nauseous).  ? Vomiting.  ? Dizziness.  ? Being sensitive to bright lights, loud noises, or smells.  · Before you get a migraine headache, you may get warning signs (an aura). An aura may include:  ? Seeing flashing lights or having blind spots.  ? Seeing bright spots, halos, or zigzag lines.  ? Having tunnel vision or blurred vision.  ? Having numbness or a tingling feeling.  ? Having trouble talking.  ? Having weak muscles.  · Some people have symptoms after a migraine headache (postdromal phase), such as:  ? Tiredness.  ? Trouble thinking (concentrating).  How is this treated?  · Taking medicines that:  ? Relieve pain.  ? Relieve the feeling of being sick to your stomach.  ? Prevent migraine headaches.  · Treatment may also include:  ? Having acupuncture.  ? Avoiding foods that bring on migraine headaches.  ? Learning ways to control your body functions (biofeedback).  ? Therapy to help you know and deal with negative thoughts (cognitive behavioral therapy).  Follow these instructions at home:  Medicines  · Take over-the-counter and prescription medicines only as told by your doctor.  · Ask your doctor if the medicine prescribed to you:  ? Requires you to avoid driving or using heavy machinery.  ? Can cause trouble pooping (constipation). You may need to take these steps to prevent or treat trouble pooping:  § Drink enough fluid to keep your pee (urine) pale yellow.  § Take over-the-counter or prescription medicines.  § Eat foods that are high in fiber. These include beans, whole grains, and fresh fruits and vegetables.  § Limit foods that are high in fat and sugar. These  include fried or sweet foods.  Lifestyle  · Do not drink alcohol.  · Do not use any products that contain nicotine or tobacco, such as cigarettes, e-cigarettes, and chewing tobacco. If you need help quitting, ask your doctor.  · Get at least 8 hours of sleep every night.  · Limit and deal with stress.  General instructions         · Keep a journal to find out what may bring on your migraine headaches. For example, write down:  ? What you eat and drink.  ? How much sleep you get.  ? Any change in what you eat or drink.  ? Any change in your medicines.  · If you have a migraine headache:  ? Avoid things that make your symptoms worse, such as bright lights.  ? It may help to lie down in a dark, quiet room.  ? Do not drive or use heavy machinery.  ? Ask your doctor what activities are safe for you.  · Keep all follow-up visits as told by your doctor. This is important.  Contact a doctor if:  · You get a migraine headache that is different or worse than others you have had.  · You have more than 15 headache days in one month.  Get help right away if:  · Your migraine headache gets very bad.  · Your migraine headache lasts longer than 72 hours.  · You have a fever.  · You have a stiff neck.  · You have trouble seeing.  · Your muscles feel weak or like you cannot control them.  · You start to lose your balance a lot.  · You start to have trouble walking.  · You pass out (faint).  · You have a seizure.  Summary  · A migraine headache is a very strong throbbing pain on one side or both sides of your head. These headaches can also cause other symptoms.  · This condition may be treated with medicines and changes to your lifestyle.  · Keep a journal to find out what may bring on your migraine headaches.  · Contact a doctor if you get a migraine headache that is different or worse than others you have had.  · Contact your doctor if you have more than 15 headache days in a month.  This information is not intended to replace  advice given to you by your health care provider. Make sure you discuss any questions you have with your health care provider.  Document Released: 09/26/2009 Document Revised: 04/10/2020 Document Reviewed: 01/30/2020  Elsevier Patient Education © 2020 [employer]

## 2021-02-09 NOTE — PROGRESS NOTES
"Subjective:      HPI  Rosa Walker is a 65 y.o. female who presents with chronic migraine.     She was referred by Dr. Jenny Blake     She had some headaches when she was a kid, but they really started when she was about 43 years old and was flako-menopausal.  In the past year she started getting ocular migraine, these are new. She is interested in monthly Aimovig. She is not getting painful migraines, she has not had one in about 18 months.   She had a MRI of brain and C-spine in the past,brain MRI normal.  She had some changes in C5-C6.       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      Patient is here alone today, I last saw her on 11/12/2020.   We started Aimovig which reduced her headache days by at least half.  She has had 4 in the past 2 weeks because she was late with her last Aimovig.   Nurtec helps for the ocular migraine.  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         Age at Onset:  43  Triggers: none   Alleviating factors:  Nurtec has helped the ocular migraines  Meds tried and result:  She has been on neurontin for neck pain but it makes her very \"spacey\"   Imitrex gave her a dull headaches that lasted 24 hours and interfered with her eating and swallowing.   Used propranolol in the past, which caused excessive fatigue.  Aimovig has reduced headaches by about 50%.  Nurtec stops the ocular migraine.    Hormones:  Hormone patch   Caffeine use:  2 cups of coffee daily.   OTC medications--frequency: ibuprofen 200-400mg about 5 times per week.   Smoking: none   Alcohol use:  1 glass of wine per week.   Sleep apnea: none   Family Hx:  2 brothers, father, 4/5 of her children.   How many days per month:  5-6/30 (reduced from 10-12/30 pre-Aimovig)  Missed days of school/work in past 6 months: none   Quality:  " "Ocular with kaleidoscope of black and white and areas of missing vision-pixelated vision   N&V:  Nausea with headaches not with ocular migraines.   Photo/phonophobia:   Photophobia and phonophobia when she had head pain, none with ocular migraines.   Aura: none  Prodrome:  Light sensitivity  and neck tension.     Review of Systems   Constitutional: Negative for fever.   HENT: Negative for hearing loss.    Eyes: Negative for blurred vision.   Respiratory: Negative for cough.    Cardiovascular: Negative for chest pain.   Gastrointestinal: Negative for heartburn, nausea and vomiting.   Genitourinary: Negative for dysuria.   Musculoskeletal: Negative for back pain and neck pain.   Skin: Negative for rash.   Neurological: Positive for headaches. Negative for dizziness, tingling and sensory change.   Endo/Heme/Allergies: Bruises/bleeds easily.   Psychiatric/Behavioral: Negative for depression. The patient is not nervous/anxious and does not have insomnia.         Increased stress.           Objective:   Encounter Vitals  Standard Vitals  Vitals  Blood Pressure : (!) 92/50  Temperature: 36.3 °C (97.3 °F)  Temp src: Temporal  Pulse: 72  Respiration: 12  Pulse Oximetry: 99 %  Height: 167.6 cm (5' 6\")  Weight: 48.3 kg (106 lb 7.7 oz)  Encounter Vitals  Temperature: 36.3 °C (97.3 °F)  Temp src: Temporal  Blood Pressure : (!) 92/50  Pulse: 72  Respiration: 12  Pulse Oximetry: 99 %  Weight: 48.3 kg (106 lb 7.7 oz)  Height: 167.6 cm (5' 6\")  BMI (Calculated): 17.19    Physical Exam    Constitutional:  Alert, no apparent distress,  Psych:   mood and affect WNL  Neuro:  Oriented X 4, speech fluent, naming and memory intact  Muskuloskeletal:  Moves all extremities equally, strength 5/5 bilaterally, no drift  CN II: Visual fields are full to confrontation Pupils are 3  mm and briskly reactive to light.   CN III, IV, VI  EOMs intact, no ptosis  CN V: Facial sensation is intact to pinprick  Corneal responses are intact.  CN VII: Face " is symmetric with normal eye closure and smile.  CN VII: Hearing is normal to rubbing fingers  CN IX, X: Palate elevates symmetrically. Phonation is normal.  CN XI: Head turning and shoulder shrug are intact  CN XII: Tongue is midline with normal movements and no atrophy.                           Sensation to PP equal bilaterally                 No limb ataxia with finger to nose and heel to shin                 Ambulates with steady gait.                 Rhomberg negative                  Cardiovascular:    S1S2, no abnormal rhythm auscultated, no peripheral edema  Neck:                     No carotid bruits noted   Pulmonary:            Respirations easy, lungs clear to auscultation all fields.     Skin:                     No obvious rashes.      Assessment/Plan:   1. Chronic migraine, well controlled on Aimovig, symptoms controlled with Aimovig.        Conitnue supplements  Continue Aimovig 140mg every 30 days.   Continue Nurtec 75mg. PRN    Would avoid Imitrex due to side effects, age and hormone use.     Avoid beta blocker, blood pressure is too low  Avoid anti-depressants, she is already on trintellix.      2. Ocular migraine      See above.                 I spen 27 minutes caring for patient, my time includes reviewing the chart, obtaining current HPI, exam, discussion of disease process, ordering medications and counseling.      I counseled patient on supplements.    Follow up in 1 year.

## 2021-02-10 ENCOUNTER — TELEPHONE (OUTPATIENT)
Dept: NEUROLOGY | Facility: MEDICAL CENTER | Age: 66
End: 2021-02-10

## 2021-02-10 DIAGNOSIS — G43.709 CHRONIC MIGRAINE WITHOUT AURA WITHOUT STATUS MIGRAINOSUS, NOT INTRACTABLE: ICD-10-CM

## 2021-02-10 NOTE — TELEPHONE ENCOUNTER
Good morning, Looks like the PA for Nurtec 75mg is still good till the end of November of 2021. Please put order in my que so I can contact Pt to use a copay card. Thanks

## 2021-03-25 ENCOUNTER — TELEPHONE (OUTPATIENT)
Dept: NEUROLOGY | Facility: MEDICAL CENTER | Age: 66
End: 2021-03-25

## 2021-03-26 NOTE — TELEPHONE ENCOUNTER
Spoke to patient gave her information on how to get Aimovig co-pay card. She will let me know if she needs anything else.

## 2021-05-20 ENCOUNTER — TELEPHONE (OUTPATIENT)
Dept: NEUROLOGY | Facility: MEDICAL CENTER | Age: 66
End: 2021-05-20

## 2021-05-22 DIAGNOSIS — F32.A DEPRESSION, UNSPECIFIED DEPRESSION TYPE: ICD-10-CM

## 2021-05-25 RX ORDER — VORTIOXETINE 5 MG/1
TABLET, FILM COATED ORAL
Qty: 90 TABLET | Refills: 2 | Status: SHIPPED | OUTPATIENT
Start: 2021-05-25 | End: 2022-03-11 | Stop reason: SDUPTHER

## 2021-05-31 ENCOUNTER — PATIENT MESSAGE (OUTPATIENT)
Dept: NEUROLOGY | Facility: MEDICAL CENTER | Age: 66
End: 2021-05-31

## 2021-06-01 NOTE — TELEPHONE ENCOUNTER
From: Rosa Walker  To: Nurse Practitioner Dianne Webster  Sent: 5/31/2021 7:59 PM PDT  Subject: Prescription Question    Hi i am still wondering if you are going to try and get PA on the neurotec Rx I am doing well with coupon for Aimovig  I feel I have failed triptans and ocular migraines even on Aimovig are BEST treated with neurotec  Issue is i now have [employer] insurance until 10/2021 . CHETAN WILLING to self pay any Ovs and I gave your staff the insurance info ,it is with [facility] for RXs, if they can try and process i woull d appreciate I have no more samples at my office .... María Walker

## 2021-06-01 NOTE — PATIENT COMMUNICATION
Spoke to patient she has new insurance I asked her to fax back and front of cards and fax to [phone]. I suggested she activate the nurtec card to get script filled. She verbalized understanding.

## 2021-06-15 PROBLEM — M79.18 CERVICAL MYOFASCIAL PAIN SYNDROME: Status: ACTIVE | Noted: 2021-06-15

## 2021-06-15 PROBLEM — M54.81 BILATERAL OCCIPITAL NEURALGIA: Status: ACTIVE | Noted: 2021-06-15

## 2021-06-15 PROBLEM — M54.2 CERVICALGIA: Status: ACTIVE | Noted: 2021-06-15

## 2021-07-01 ENCOUNTER — PATIENT MESSAGE (OUTPATIENT)
Dept: NEUROLOGY | Facility: MEDICAL CENTER | Age: 66
End: 2021-07-01

## 2022-03-11 DIAGNOSIS — F32.A DEPRESSION, UNSPECIFIED DEPRESSION TYPE: ICD-10-CM

## 2022-03-11 RX ORDER — VORTIOXETINE 5 MG/1
1 TABLET, FILM COATED ORAL
Qty: 90 TABLET | Refills: 0 | Status: SHIPPED | OUTPATIENT
Start: 2022-03-11 | End: 2022-05-06 | Stop reason: SDUPTHER

## 2022-03-11 NOTE — TELEPHONE ENCOUNTER
Received request via: Patient    Was the patient seen in the last year in this department? No    Does the patient have an active prescription (recently filled or refills available) for medication(s) requested? No     Pt scheduled for appt 4/8/22

## 2022-05-06 ENCOUNTER — OFFICE VISIT (OUTPATIENT)
Dept: MEDICAL GROUP | Facility: MEDICAL CENTER | Age: 67
End: 2022-05-06

## 2022-05-06 VITALS
SYSTOLIC BLOOD PRESSURE: 88 MMHG | OXYGEN SATURATION: 97 % | WEIGHT: 105.16 LBS | HEART RATE: 70 BPM | BODY MASS INDEX: 16.9 KG/M2 | HEIGHT: 66 IN | DIASTOLIC BLOOD PRESSURE: 52 MMHG | TEMPERATURE: 97.4 F

## 2022-05-06 DIAGNOSIS — F32.A DEPRESSION, UNSPECIFIED DEPRESSION TYPE: ICD-10-CM
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
## 2022-05-06 DIAGNOSIS — M85.80 OSTEOPENIA, UNSPECIFIED LOCATION: ICD-10-CM

## 2022-05-06 DIAGNOSIS — N95.9 MENOPAUSAL AND POSTMENOPAUSAL DISORDER: ICD-10-CM

## 2022-05-06 DIAGNOSIS — Z00.00 PREVENTATIVE HEALTH CARE: ICD-10-CM

## 2022-05-06 DIAGNOSIS — H93.19 TINNITUS, UNSPECIFIED LATERALITY: ICD-10-CM

## 2022-05-06 PROCEDURE — 99397 PER PM REEVAL EST PAT 65+ YR: CPT | Performed by: FAMILY MEDICINE

## 2022-05-06 RX ORDER — VORTIOXETINE 5 MG/1
1 TABLET, FILM COATED ORAL
Qty: 90 TABLET | Refills: 3 | Status: SHIPPED | OUTPATIENT
Start: 2022-05-06 | End: 2023-06-01

## 2022-05-06 ASSESSMENT — PATIENT HEALTH QUESTIONNAIRE - PHQ9: CLINICAL INTERPRETATION OF PHQ2 SCORE: 0

## 2022-05-06 NOTE — PROGRESS NOTES
This medical record contains text that has been entered with the assistance of computer voice recognition and dictation software.  Therefore, it may contain unintended errors in text, spelling, punctuation, or grammar        Chief Complaint   Patient presents with   • Medication Refill   • Referral Needed     ENT, Ear pain,hearing check       Rosa Walker is a 66 y.o. female here evaluation and management of:         Current Outpatient Medications   Medication Sig Dispense Refill   • Vortioxetine HBr (TRINTELLIX) 5 MG Tab Take 1 Tablet by mouth every day. 90 Tablet 3   • Prasterone (INTRAROSA) 6.5 MG INSERT Insert 6.5 mg in vagina.     • estradiol-levonorgestrel (CLIMARAPRO) 0.045-0.015 MG/DAY patch Apply 1 Patch to skin as directed every 7 days. 12 Patch 3     No current facility-administered medications for this visit.     Patient Active Problem List    Diagnosis Date Noted   • Preventative health care 05/06/2022   • Cervicalgia 06/15/2021   • Cervical myofascial pain syndrome 06/15/2021   • Bilateral occipital neuralgia 06/15/2021   • Chronic migraine 11/12/2020   • Ocular migraine 11/12/2020   • Migraine 09/30/2020   • Atrophic vaginitis 05/12/2020   • Open-angle glaucoma 08/17/2018   • Osteopenia of necks of both femurs 08/17/2018   • Annual physical exam 08/17/2018   • Neck pain 10/02/2014   • Family history of ovarian cancer 10/02/2014   • Depression 07/16/2013   • Hot flashes 07/16/2013     Past Surgical History:   Procedure Laterality Date   • CATARACT PHACO WITH IOL  5/7/2012    Performed by GENO ARREDONDO at SURGERY [facility] ORS   • CATARACT PHACO WITH IOL  4/16/2012    Performed by GENO ARREDONDO at SURGERY [facility] ORS   • KNEE ARTHROSCOPY  9/9/2010    Performed by FLORI CHILDRESS at SURGERY [facility]   • MENISCECTOMY  9/9/2010    Performed by FLORI CHILDRESS at [facility] ORS   • OTHER SURGICAL PROCEDURE  6/2005    Full thickness skin graft left politeal fossa  "  • RETINAL DETACHMENT REPAIR  3/1992    left    • EYE SURGERY        Social History     Tobacco Use   • Smoking status: Former Smoker     Packs/day: 0.50     Years: 10.00     Pack years: 5.00     Types: Cigarettes     Quit date: 1985     Years since quittin.3   • Smokeless tobacco: Never Used   Vaping Use   • Vaping Use: Never used   Substance Use Topics   • Alcohol use: Yes     Alcohol/week: 3.0 oz     Types: 5 Glasses of wine per week     Comment: 5 glass wine per week   • Drug use: No     Family History   Problem Relation Age of Onset   • Cancer Mother 74        ovarian   • Other Father         sepsis           ROS    all review of system completed and negative except for those listed above     Objective:     BP (!) 88/52 (BP Location: Right arm, Patient Position: Sitting, BP Cuff Size: Adult)   Pulse 70   Temp 36.3 °C (97.4 °F) (Temporal)   Ht 1.676 m (5' 6\")   Wt 47.7 kg (105 lb 2.6 oz)   SpO2 97%  Body mass index is 16.97 kg/m².  Physical Exam:    Constitutional: Alert, no distress.  Skin: Warm, dry, good turgor, no rashes in visible areas.  Eye: Equal, round and reactive, conjunctiva clear, lids normal.  ENMT: Lips without lesions, good dentition, oropharynx clear.  Neck: Trachea midline, no masses, no thyromegaly. No cervical or supraclavicular lymphadenopathy.  Respiratory: Unlabored respiratory effort, lungs clear to auscultation, no wheezes, no ronchi.  Cardiovascular: Normal S1, S2, no murmur, no edema.  Abdomen: Soft, non-tender, no masses, no hepatosplenomegaly.  Psych: Alert and oriented x3, normal affect and mood.          Assessment and Plan:   The following treatment plan was discussed        Problem List Items Addressed This Visit     Depression     Refill trintellix            Relevant Medications    Vortioxetine HBr (TRINTELLIX) 5 MG Tab    Preventative health care     Age appropriate prev health care discussed   Cancer screening discussed   Vaccines discussed   Labs offered "   Blood pressure at goal     Anticipatory guidance including SPF and other skin protective measures, dental hygiene and care, regular exercise, diet, stress and family planning advice discussed.               Other Visit Diagnoses     Tinnitus, unspecified laterality        Relevant Orders    Referral to Audiology    Referral to ENT    Osteopenia, unspecified location        Relevant Orders    DS-BONE DENSITY STUDY (DEXA)    Menopausal and postmenopausal disorder        Relevant Orders    DS-BONE DENSITY STUDY (DEXA)    Moderate sun exposure, subsequent encounter        Relevant Orders    Referral to Dermatology                Instructed to follow up if symptoms worsen or fail to improve, ER/UC precautions discussed as well    Jenny Blake MD  [facility], Family Medicine   [address] [place] [address]   [place] [address]  Phone: [phone]

## 2022-10-26 ENCOUNTER — HOSPITAL ENCOUNTER (OUTPATIENT)
Dept: LAB | Facility: MEDICAL CENTER | Age: 67
End: 2022-10-26
Attending: OBSTETRICS & GYNECOLOGY
Payer: COMMERCIAL

## 2022-10-26 LAB
25(OH)D3 SERPL-MCNC: 99 NG/ML (ref 30–100)
ALBUMIN SERPL BCP-MCNC: 4.6 G/DL (ref 3.2–4.9)
ALBUMIN/GLOB SERPL: 1.9 G/DL
ALP SERPL-CCNC: 56 U/L (ref 30–99)
ALT SERPL-CCNC: 13 U/L (ref 2–50)
ANION GAP SERPL CALC-SCNC: 10 MMOL/L (ref 7–16)
AST SERPL-CCNC: 19 U/L (ref 12–45)
BASOPHILS # BLD AUTO: 0.9 % (ref 0–1.8)
BASOPHILS # BLD: 0.04 K/UL (ref 0–0.12)
BILIRUB SERPL-MCNC: 1.3 MG/DL (ref 0.1–1.5)
BUN SERPL-MCNC: 12 MG/DL (ref 8–22)
CALCIUM SERPL-MCNC: 9.3 MG/DL (ref 8.5–10.5)
CHLORIDE SERPL-SCNC: 102 MMOL/L (ref 96–112)
CHOLEST SERPL-MCNC: 168 MG/DL (ref 100–199)
CO2 SERPL-SCNC: 26 MMOL/L (ref 20–33)
CREAT SERPL-MCNC: 0.62 MG/DL (ref 0.5–1.4)
EOSINOPHIL # BLD AUTO: 0.04 K/UL (ref 0–0.51)
EOSINOPHIL NFR BLD: 0.9 % (ref 0–6.9)
ERYTHROCYTE [DISTWIDTH] IN BLOOD BY AUTOMATED COUNT: 50.6 FL (ref 35.9–50)
EST. AVERAGE GLUCOSE BLD GHB EST-MCNC: 105 MG/DL
FASTING STATUS PATIENT QL REPORTED: NORMAL
GFR SERPLBLD CREATININE-BSD FMLA CKD-EPI: 96 ML/MIN/1.73 M 2
GLOBULIN SER CALC-MCNC: 2.4 G/DL (ref 1.9–3.5)
GLUCOSE SERPL-MCNC: 93 MG/DL (ref 65–99)
HBA1C MFR BLD: 5.3 % (ref 4–5.6)
HCT VFR BLD AUTO: 45.9 % (ref 37–47)
HDLC SERPL-MCNC: 73 MG/DL
HGB BLD-MCNC: 15.2 G/DL (ref 12–16)
IMM GRANULOCYTES # BLD AUTO: 0.02 K/UL (ref 0–0.11)
IMM GRANULOCYTES NFR BLD AUTO: 0.5 % (ref 0–0.9)
LDLC SERPL CALC-MCNC: 80 MG/DL
LYMPHOCYTES # BLD AUTO: 1.2 K/UL (ref 1–4.8)
LYMPHOCYTES NFR BLD: 28.1 % (ref 22–41)
MCH RBC QN AUTO: 32.8 PG (ref 27–33)
MCHC RBC AUTO-ENTMCNC: 33.1 G/DL (ref 33.6–35)
MCV RBC AUTO: 99.1 FL (ref 81.4–97.8)
MONOCYTES # BLD AUTO: 0.41 K/UL (ref 0–0.85)
MONOCYTES NFR BLD AUTO: 9.6 % (ref 0–13.4)
NEUTROPHILS # BLD AUTO: 2.56 K/UL (ref 2–7.15)
NEUTROPHILS NFR BLD: 60 % (ref 44–72)
NRBC # BLD AUTO: 0 K/UL
NRBC BLD-RTO: 0 /100 WBC
PLATELET # BLD AUTO: 246 K/UL (ref 164–446)
PMV BLD AUTO: 11.5 FL (ref 9–12.9)
POTASSIUM SERPL-SCNC: 4.3 MMOL/L (ref 3.6–5.5)
PROT SERPL-MCNC: 7 G/DL (ref 6–8.2)
RBC # BLD AUTO: 4.63 M/UL (ref 4.2–5.4)
SODIUM SERPL-SCNC: 138 MMOL/L (ref 135–145)
TRIGL SERPL-MCNC: 73 MG/DL (ref 0–149)
TSH SERPL DL<=0.005 MIU/L-ACNC: 5.07 UIU/ML (ref 0.38–5.33)
WBC # BLD AUTO: 4.3 K/UL (ref 4.8–10.8)

## 2022-10-26 PROCEDURE — 80053 COMPREHEN METABOLIC PANEL: CPT

## 2022-10-26 PROCEDURE — 82306 VITAMIN D 25 HYDROXY: CPT

## 2022-10-26 PROCEDURE — 83036 HEMOGLOBIN GLYCOSYLATED A1C: CPT

## 2022-10-26 PROCEDURE — 80061 LIPID PANEL: CPT

## 2022-10-26 PROCEDURE — 36415 COLL VENOUS BLD VENIPUNCTURE: CPT

## 2022-10-26 PROCEDURE — 85025 COMPLETE CBC W/AUTO DIFF WBC: CPT

## 2022-10-26 PROCEDURE — 84443 ASSAY THYROID STIM HORMONE: CPT

## 2022-12-08 ENCOUNTER — TELEPHONE (OUTPATIENT)
Dept: NEUROLOGY | Facility: MEDICAL CENTER | Age: 67
End: 2022-12-08
Payer: COMMERCIAL

## 2022-12-08 NOTE — TELEPHONE ENCOUNTER

## 2022-12-13 ENCOUNTER — OFFICE VISIT (OUTPATIENT)
Dept: MEDICAL GROUP | Facility: MEDICAL CENTER | Age: 67
End: 2022-12-13
Payer: COMMERCIAL

## 2022-12-13 VITALS
OXYGEN SATURATION: 98 % | SYSTOLIC BLOOD PRESSURE: 98 MMHG | WEIGHT: 106.04 LBS | DIASTOLIC BLOOD PRESSURE: 58 MMHG | BODY MASS INDEX: 17.04 KG/M2 | TEMPERATURE: 99.2 F | HEIGHT: 66 IN | HEART RATE: 69 BPM

## 2022-12-13 DIAGNOSIS — Z12.31 ENCOUNTER FOR SCREENING MAMMOGRAM FOR BREAST CANCER: ICD-10-CM

## 2022-12-13 DIAGNOSIS — Z80.41 FAMILY HISTORY OF MALIGNANT NEOPLASM OF OVARY: ICD-10-CM

## 2022-12-13 DIAGNOSIS — G43.809 OTHER MIGRAINE WITHOUT STATUS MIGRAINOSUS, NOT INTRACTABLE: ICD-10-CM

## 2022-12-13 DIAGNOSIS — F32.A DEPRESSION, UNSPECIFIED DEPRESSION TYPE: ICD-10-CM

## 2022-12-13 PROCEDURE — 99214 OFFICE O/P EST MOD 30 MIN: CPT | Performed by: FAMILY MEDICINE

## 2022-12-13 RX ORDER — ESTRADIOL/NORETHINDRONE ACETATE TRANSDERMAL SYSTEM .05; .14 MG/D; MG/D
PATCH, EXTENDED RELEASE TRANSDERMAL
COMMUNITY
Start: 2022-12-07

## 2022-12-13 ASSESSMENT — FIBROSIS 4 INDEX: FIB4 SCORE: 1.48

## 2022-12-13 NOTE — ASSESSMENT & PLAN NOTE
Plans to consult with neurology formally as she has poor control over her migraines with traditional abortive therapies

## 2022-12-13 NOTE — ASSESSMENT & PLAN NOTE
Continues trintellix   Did loose her job recently and will be caring for her mother in law   Now on husbands insurance, he is working in trauma/icu      30+ min spent

## 2022-12-13 NOTE — PROGRESS NOTES
This medical record contains text that has been entered with the assistance of computer voice recognition and dictation software.  Therefore, it may contain unintended errors in text, spelling, punctuation, or grammar        Chief Complaint   Patient presents with    Follow-Up     Discuss Neurology tyrell. Migraines        Rosa Walker is a 67 y.o. female here evaluation and management of:       Current Outpatient Medications   Medication Sig Dispense Refill    COMBIPATCH 0.05-0.14 MG/DAY patch       Vortioxetine HBr 5 MG Tab Take 1 Tablet by mouth every day. 90 Tablet 3    Vortioxetine HBr (TRINTELLIX) 5 MG Tab Take 1 Tablet by mouth every day. 90 Tablet 3    Prasterone (INTRAROSA) 6.5 MG INSERT Insert 6.5 mg in vagina.      estradiol-levonorgestrel (CLIMARAPRO) 0.045-0.015 MG/DAY patch Apply 1 Patch to skin as directed every 7 days. (Patient not taking: Reported on 12/13/2022) 12 Patch 3     No current facility-administered medications for this visit.     Patient Active Problem List    Diagnosis Date Noted    Preventative health care 05/06/2022    Cervicalgia 06/15/2021    Cervical myofascial pain syndrome 06/15/2021    Bilateral occipital neuralgia 06/15/2021    Chronic migraine 11/12/2020    Ocular migraine 11/12/2020    Migraine 09/30/2020    Atrophic vaginitis 05/12/2020    Open-angle glaucoma 08/17/2018    Osteopenia of necks of both femurs 08/17/2018    Annual physical exam 08/17/2018    Neck pain 10/02/2014    Family history of ovarian cancer 10/02/2014    Depression 07/16/2013    Hot flashes 07/16/2013     Past Surgical History:   Procedure Laterality Date    CATARACT PHACO WITH IOL  5/7/2012    Performed by GENO ARREDONDO at SURGERY [facility]S    CATARACT PHACO WITH IOL  4/16/2012    Performed by GENO ARREDONDO at SURGERY [facility] ORS    KNEE ARTHROSCOPY  9/9/2010    Performed by FLORI CHILDRESS at [facility] ORS    MENISCECTOMY  9/9/2010    Performed by FLORI CHILDRESS  "at SURGERY [facility]    OTHER SURGICAL PROCEDURE  2005    Full thickness skin graft left politeal fossa    RETINAL DETACHMENT REPAIR  3/1992    left     EYE SURGERY        Social History     Tobacco Use    Smoking status: Former     Packs/day: 0.50     Years: 10.00     Pack years: 5.00     Types: Cigarettes     Quit date: 1985     Years since quittin.9    Smokeless tobacco: Never   Vaping Use    Vaping Use: Never used   Substance Use Topics    Alcohol use: Yes     Alcohol/week: 3.0 oz     Types: 5 Glasses of wine per week     Comment: 5 glass wine per week    Drug use: No     Family History   Problem Relation Age of Onset    Cancer Mother 74        ovarian    Other Father         sepsis           ROS    all review of system completed and negative except for those listed above     Objective:     BP (!) 98/58 (BP Location: Left arm, Patient Position: Sitting, BP Cuff Size: Adult long)   Pulse 69   Temp 37.3 °C (99.2 °F) (Temporal)   Ht 1.676 m (5' 6\")   Wt 48.1 kg (106 lb 0.7 oz)   SpO2 98%  Body mass index is 17.12 kg/m².  Physical Exam:        GEN: comfortable, alert and oriented, well nourished, well developed, in no apparent distress   HEENT: NCAT, eyes: pupils equal and reactive, sclera white, EOMIT, good dentition  HEART: limbs warm and well perfused, regular rate, no JVD, no lower extremity edema  LUNGS: speaking in full sentences, not in apparent respiratory distress, no audible wheezes  MSK: normal tone and bulk, no swelling of the joints, gait steady and normal           Assessment and Plan:   The following treatment plan was discussed        Problem List Items Addressed This Visit       Depression     Continues trintellix   Did loose her job recently and will be caring for her mother in law   Now on husbands insurance, he is working in trauma/icu      30+ min spent          Relevant Medications    Vortioxetine HBr 5 MG Tab    Family history of ovarian cancer     Plans to continue " annual visits with her gynocologist              Relevant Orders    Referral to Gynecology    Migraine     Plans to consult with neurology formally as she has poor control over her migraines with traditional abortive therapies          Relevant Medications    Vortioxetine HBr 5 MG Tab    Other Relevant Orders    Referral to Neurology     Other Visit Diagnoses       Encounter for screening mammogram for breast cancer        Relevant Orders    [id number]                  Instructed to follow up if symptoms worsen or fail to improve, ER/UC precautions discussed as well    Jenny Blake MD  [facility], Family Medicine   [address] [place] [address]   [place] [address]  Phone: [phone]

## 2022-12-15 ENCOUNTER — OFFICE VISIT (OUTPATIENT)
Dept: NEUROLOGY | Facility: MEDICAL CENTER | Age: 67
End: 2022-12-15
Attending: NURSE PRACTITIONER
Payer: COMMERCIAL

## 2022-12-15 VITALS
RESPIRATION RATE: 14 BRPM | OXYGEN SATURATION: 97 % | WEIGHT: 106 LBS | HEIGHT: 66 IN | HEART RATE: 81 BPM | SYSTOLIC BLOOD PRESSURE: 98 MMHG | DIASTOLIC BLOOD PRESSURE: 62 MMHG | TEMPERATURE: 98.3 F | BODY MASS INDEX: 17.04 KG/M2

## 2022-12-15 DIAGNOSIS — G43.709 CHRONIC MIGRAINE WITHOUT AURA WITHOUT STATUS MIGRAINOSUS, NOT INTRACTABLE: ICD-10-CM

## 2022-12-15 DIAGNOSIS — G43.109 OCULAR MIGRAINE: ICD-10-CM

## 2022-12-15 PROCEDURE — 99213 OFFICE O/P EST LOW 20 MIN: CPT | Performed by: NURSE PRACTITIONER

## 2022-12-15 PROCEDURE — 99212 OFFICE O/P EST SF 10 MIN: CPT | Performed by: NURSE PRACTITIONER

## 2022-12-15 RX ORDER — UBROGEPANT 100 MG/1
100 TABLET ORAL
COMMUNITY
End: 2023-09-13

## 2022-12-15 RX ORDER — UBROGEPANT 100 MG/1
TABLET ORAL
Qty: 16 TABLET | Refills: 11 | Status: SHIPPED | OUTPATIENT
Start: 2022-12-15 | End: 2023-06-23 | Stop reason: SDUPTHER

## 2022-12-15 RX ORDER — GALCANEZUMAB 120 MG/ML
120 INJECTION, SOLUTION SUBCUTANEOUS
Qty: 1 ML | Refills: 11 | Status: SHIPPED | OUTPATIENT
Start: 2022-12-15 | End: 2023-06-23 | Stop reason: SDUPTHER

## 2022-12-15 RX ORDER — GALCANEZUMAB 120 MG/ML
INJECTION, SOLUTION SUBCUTANEOUS
COMMUNITY
End: 2022-12-15 | Stop reason: SDUPTHER

## 2022-12-15 RX ORDER — RIMEGEPANT SULFATE 75 MG/75MG
75 TABLET, ORALLY DISINTEGRATING ORAL
COMMUNITY
End: 2023-03-14

## 2022-12-15 ASSESSMENT — ENCOUNTER SYMPTOMS
COUGH: 0
BACK PAIN: 1
VOMITING: 0
BLURRED VISION: 1
HEADACHES: 1
NAUSEA: 0
DEPRESSION: 1
NECK PAIN: 1
DIZZINESS: 1
NERVOUS/ANXIOUS: 0

## 2022-12-15 ASSESSMENT — FIBROSIS 4 INDEX: FIB4 SCORE: 1.48

## 2022-12-15 NOTE — PROGRESS NOTES
"Subjective         HPI  Rosa Walker is a 67 y.o. female who presents for follow up for chronic migraine and ocular migraine.    I last saw her on 2/9/2021.  She had [facility] , which [facility] does not accept.    She continues to have ocular migraines. 2-3 times per week on average, they have decreased from 7-10 per month.      She tried Aimovig it gave her bad constipation.    Ubrelvy is effective for rescue, she failed sumatriptan and rizatriptan     She had some headaches when she was a kid, but they really started when she was about 43 years old and was flako-menopausal.  In 2020/2021, she started getting ocular migraines, they were new.      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      She            Age at Onset:  43  Triggers: none   Alleviating factors:  Nurtec has helped the ocular migraines  Meds tried and result:  She has been on neurontin for neck pain but it makes her very \"spacey\"   Imitrex gave her a dull headaches that lasted 24 hours and interfered with her eating and swallowing. Rizatriptan didn't work.    Used propranolol in the past, which caused excessive fatigue.  Aimovig has reduced headaches by about 50%.  Nurtec stops the ocular migraine.    Hormones:  Hormone patch   Caffeine use:  2 cups of coffee daily.   OTC medications--frequency: ibuprofen 200-400mg about 5 times per week.   Smoking: none   Alcohol use:  1 glass of wine per week.   Sleep apnea: none   Family Hx:  2 brothers, father, 4/5 of her children.   How many days per month:  12-15 per month    Missed days of school/work in past 6 months: none   Quality:  Ocular with kaleidoscope of black and white and areas of missing vision-pixelated vision   N&V:  Nausea with headaches not with ocular migraines.   Photo/phonophobia:   Photophobia and phonophobia when she had head " "pain, none with ocular migraines.   Aura: none  Prodrome:  Light sensitivity  and neck tension.       Review of Systems   Eyes:  Positive for blurred vision.   Respiratory:  Negative for cough.    Cardiovascular:  Negative for chest pain.   Gastrointestinal:  Negative for nausea and vomiting.   Musculoskeletal:  Positive for back pain and neck pain.   Neurological:  Positive for dizziness and headaches.   Psychiatric/Behavioral:  Positive for depression. The patient is not nervous/anxious.             Objective     Encounter Vitals  Standard Vitals  Vitals  Blood Pressure : (!) 98/62  Temperature: 36.8 °C (98.3 °F)  Temp src: Temporal  Pulse: 81  Respiration: 14  Pulse Oximetry: 97 %  Height: 167.6 cm (5' 6\")  Weight: 48.1 kg (106 lb)  Encounter Vitals  Temperature: 36.8 °C (98.3 °F)  Temp src: Temporal  Blood Pressure : (!) 98/62  Pulse: 81  Respiration: 14  Pulse Oximetry: 97 %  Weight: 48.1 kg (106 lb)  Height: 167.6 cm (5' 6\")  BMI (Calculated): 17.11        PHYSICAL EXAM  Constitutional:  Alert, no apparent distress,  Psych:   mood and affect WNL     Neuro:  Oriented X 4, speech fluent, naming and memory intact          Muskuloskeletal:  Moves all extremities equally, strength 5/5 bilaterally,          CN II: . Fundoscopic exam is normal with sharp discs and no vascular changes. Pupils are 4 mm and briskly reactive to light.          CN III, IV, VI  EOMs intact, no ptosis         CN V: Corneal responses are intact.         CN VII: Face is symmetric with normal eye closure and smile.         CN IX, X: Palate elevates symmetrically. Phonation is normal.         CN XI: Head turning and shoulder shrug are intact         CN XII: Tongue is midline with normal movements and no atrophy.                                       Ambulates with steady gait.                              Cardiovascular:    S1S2, no abnormal rhythm auscultated, no peripheral edema  Neck:                    supple  Pulmonary:            " Respirations easy, lungs clear to auscultation all fields.     Skin:                     No obvious rashes.           Assessment & Plan        1. Chronic migraine without aura without status migrainosus, not intractable        Conitnue supplements  Continue Emgality 120mg    Continue Ubrelvy 100mg Take 1/2-1 tablet po at onset of headache, may repeat dose in 2 hours if unrelieved.  Do not exceed more than 2 tablets in 24 hours.          Avoid beta blocker, blood pressure is too low  Avoid anti-depressants, she is already on trintellix.  She has already tried Neurontin        2. Ocular migraine  See above.            I spent 23 minutes caring for patient, my time includes reviewing the chart, obtaining current HPI, exam, discussion of disease process, ordering medications and counseling.        I counseled patient on supplements.     Follow up in 1 year

## 2022-12-21 ENCOUNTER — TELEPHONE (OUTPATIENT)
Dept: NEUROLOGY | Facility: MEDICAL CENTER | Age: 67
End: 2022-12-21

## 2023-02-24 ENCOUNTER — OFFICE VISIT (OUTPATIENT)
Dept: MEDICAL GROUP | Facility: MEDICAL CENTER | Age: 68
End: 2023-02-24
Payer: COMMERCIAL

## 2023-02-24 VITALS
OXYGEN SATURATION: 96 % | SYSTOLIC BLOOD PRESSURE: 118 MMHG | TEMPERATURE: 97.8 F | HEIGHT: 66 IN | WEIGHT: 105.2 LBS | RESPIRATION RATE: 16 BRPM | BODY MASS INDEX: 16.91 KG/M2 | HEART RATE: 60 BPM | DIASTOLIC BLOOD PRESSURE: 66 MMHG

## 2023-02-24 DIAGNOSIS — M25.561 CHRONIC PAIN OF RIGHT KNEE: ICD-10-CM

## 2023-02-24 DIAGNOSIS — G89.29 CHRONIC PAIN OF RIGHT KNEE: ICD-10-CM

## 2023-02-24 DIAGNOSIS — F41.9 ANXIETY: ICD-10-CM

## 2023-02-24 PROCEDURE — 99213 OFFICE O/P EST LOW 20 MIN: CPT | Performed by: STUDENT IN AN ORGANIZED HEALTH CARE EDUCATION/TRAINING PROGRAM

## 2023-02-24 ASSESSMENT — ENCOUNTER SYMPTOMS
CHILLS: 0
ABDOMINAL PAIN: 0
FOCAL WEAKNESS: 0
VOMITING: 0
NERVOUS/ANXIOUS: 1
SHORTNESS OF BREATH: 0
FEVER: 0
COUGH: 0
NAUSEA: 0

## 2023-02-24 ASSESSMENT — FIBROSIS 4 INDEX: FIB4 SCORE: 1.48

## 2023-02-24 ASSESSMENT — PATIENT HEALTH QUESTIONNAIRE - PHQ9: CLINICAL INTERPRETATION OF PHQ2 SCORE: 0

## 2023-02-24 NOTE — PROGRESS NOTES
"Subjective:     CC: anxiety about trip, flying    HPI:   Rosa presents today with    Problem   Right Knee Pain    Chronic for 20 years, aggravated by recent fall skiing. NSAID helps.     Anxiety    Patient is anxious about upcoming family trip.       ROS:  Review of Systems   Constitutional:  Negative for chills and fever.   Respiratory:  Negative for cough and shortness of breath.    Cardiovascular:  Negative for chest pain and leg swelling.   Gastrointestinal:  Negative for abdominal pain, nausea and vomiting.   Musculoskeletal:  Positive for joint pain (right knee pain).   Neurological:  Negative for focal weakness.   Psychiatric/Behavioral:  The patient is nervous/anxious.      Objective:     Exam:  /66 (BP Location: Left arm, Patient Position: Sitting)   Pulse 60   Temp 36.6 °C (97.8 °F) (Temporal)   Resp 16   Ht 1.676 m (5' 6\")   Wt 47.7 kg (105 lb 3.2 oz)   SpO2 96%   BMI 16.98 kg/m²  Body mass index is 16.98 kg/m².    Physical Exam  Vitals reviewed.   HENT:      Head: Normocephalic.      Mouth/Throat:      Mouth: Mucous membranes are moist.      Pharynx: Oropharynx is clear.   Eyes:      Pupils: Pupils are equal, round, and reactive to light.   Cardiovascular:      Rate and Rhythm: Normal rate and regular rhythm.   Pulmonary:      Effort: Pulmonary effort is normal. No respiratory distress.      Breath sounds: No wheezing or rales.   Abdominal:      General: Abdomen is flat. Bowel sounds are normal. There is no distension.      Tenderness: There is no abdominal tenderness. There is no guarding.   Musculoskeletal:         General: Tenderness (right knee) present.   Skin:     General: Skin is warm.   Neurological:      General: No focal deficit present.      Mental Status: She is alert and oriented to person, place, and time.     Assessment & Plan:     67 y.o. female with the following -       1. Chronic pain of right knee  Aggravated pain with recent fall skiing  Patient refused referral to " PT.  Patient will follow up with LIZZ[facility]  - DX-KNEE 3 VIEWS RIGHT; Future    2. Anxiety  For upcoming trip. Requesting medications. Offered referral to behavioral therapy and psychiatry. Patient refuses. Patient will follow up with PCP    Return for follow up with PCP .    Please note that this dictation was created using voice recognition software. I have made every reasonable attempt to correct obvious errors, but I expect that there are errors of grammar and possibly content that I did not discover before finalizing the note.

## 2023-03-14 ENCOUNTER — TELEPHONE (OUTPATIENT)
Dept: HEALTH INFORMATION MANAGEMENT | Facility: OTHER | Age: 68
End: 2023-03-14

## 2023-03-14 ENCOUNTER — OFFICE VISIT (OUTPATIENT)
Dept: MEDICAL GROUP | Facility: MEDICAL CENTER | Age: 68
End: 2023-03-14
Payer: COMMERCIAL

## 2023-03-14 VITALS
WEIGHT: 103.62 LBS | OXYGEN SATURATION: 95 % | HEART RATE: 75 BPM | BODY MASS INDEX: 16.65 KG/M2 | DIASTOLIC BLOOD PRESSURE: 56 MMHG | RESPIRATION RATE: 16 BRPM | SYSTOLIC BLOOD PRESSURE: 100 MMHG | TEMPERATURE: 97 F | HEIGHT: 66 IN

## 2023-03-14 DIAGNOSIS — H54.7 VISION PROBLEM: ICD-10-CM

## 2023-03-14 DIAGNOSIS — F41.9 ANXIETY: ICD-10-CM

## 2023-03-14 PROCEDURE — 99214 OFFICE O/P EST MOD 30 MIN: CPT | Performed by: FAMILY MEDICINE

## 2023-03-14 RX ORDER — DIAZEPAM 5 MG/1
10 TABLET ORAL EVERY 6 HOURS PRN
Qty: 30 TABLET | Refills: 2 | Status: SHIPPED | OUTPATIENT
Start: 2023-03-14 | End: 2023-04-13

## 2023-03-14 ASSESSMENT — FIBROSIS 4 INDEX: FIB4 SCORE: 1.48

## 2023-03-14 NOTE — ASSESSMENT & PLAN NOTE
Has noticed perminant vision problem for about 6 mo now   Unsure if related to her retinal problem or due to previous head trauma   Has upcoming visit with neuro   And can discuss with her ophthalmologist too at that upcoming visit     30+ min spent

## 2023-03-14 NOTE — PROGRESS NOTES
This medical record contains text that has been entered with the assistance of computer voice recognition and dictation software.  Therefore, it may contain unintended errors in text, spelling, punctuation, or grammar        Chief Complaint   Patient presents with    Anxiety     In relation to travel - xanax or valium    Other     She has noticed she cannot see out of the peripheral left side of left eye. Noticed in September       Rosa Walker is a 67 y.o. female here evaluation and management of:         Current Outpatient Medications   Medication Sig Dispense Refill    diazePAM (VALIUM) 5 MG Tab Take 2 Tablets by mouth every 6 hours as needed for Anxiety for up to 30 days. 30 Tablet 2    Ubrogepant (UBRELVY) 100 MG Tab Take 100 mg by mouth.      Galcanezumab-gnlm (EMGALITY) 120 MG/ML Solution Auto-injector Inject 120 mg under the skin every 4 weeks. 1 mL 11    Ubrogepant (UBRELVY) 100 MG Tab Take 1/2-1 tablet po at onset of headache, may repeat dose in 2 hours if unrelieved.  Do not exceed more than 2 tablets in 24 hours. 16 Tablet 11    COMBIPATCH 0.05-0.14 MG/DAY patch       Vortioxetine HBr (TRINTELLIX) 5 MG Tab Take 1 Tablet by mouth every day. 90 Tablet 3    Prasterone (INTRAROSA) 6.5 MG INSERT Insert 6.5 mg in vagina.      Vortioxetine HBr 5 MG Tab Take 1 Tablet by mouth every day. 90 Tablet 3     No current facility-administered medications for this visit.     Patient Active Problem List    Diagnosis Date Noted    Vision problem 03/14/2023    Right knee pain 02/24/2023    Anxiety 02/24/2023    Preventative health care 05/06/2022    Cervicalgia 06/15/2021    Cervical myofascial pain syndrome 06/15/2021    Bilateral occipital neuralgia 06/15/2021    Chronic migraine 11/12/2020    Ocular migraine 11/12/2020    Migraine 09/30/2020    Atrophic vaginitis 05/12/2020    Open-angle glaucoma 08/17/2018    Osteopenia of necks of both femurs 08/17/2018    Annual physical exam 08/17/2018    Neck pain  "10/02/2014    Family history of ovarian cancer 10/02/2014    Depression 2013    Hot flashes 2013     Past Surgical History:   Procedure Laterality Date    CATARACT PHACO WITH IOL  2012    Performed by GENO ARREDONDO at SURGERY [facility] ORS    CATARACT PHACO WITH IOL  2012    Performed by GENO ARREDONDO at SURGERY [facility] ORS    KNEE ARTHROSCOPY  2010    Performed by FLORI CHILDRESS at SURGERY [facility] ORS    MENISCECTOMY  2010    Performed by FLORI CHILDRESS at SURGERY [facility] ORS    OTHER SURGICAL PROCEDURE  2005    Full thickness skin graft left politeal fossa    RETINAL DETACHMENT REPAIR  3/1992    left     EYE SURGERY        Social History     Tobacco Use    Smoking status: Former     Packs/day: 0.50     Years: 10.00     Pack years: 5.00     Types: Cigarettes     Quit date: 1985     Years since quittin.2    Smokeless tobacco: Never   Vaping Use    Vaping Use: Never used   Substance Use Topics    Alcohol use: Yes     Alcohol/week: 3.0 oz     Types: 5 Glasses of wine per week     Comment: 5 glass wine per week    Drug use: No     Family History   Problem Relation Age of Onset    Cancer Mother 74        ovarian    Other Father         sepsis           ROS    all review of system completed and negative except for those listed above     Objective:     /56 (BP Location: Right arm, Patient Position: Sitting, BP Cuff Size: Small adult)   Pulse 75   Temp 36.1 °C (97 °F) (Temporal)   Resp 16   Ht 1.676 m (5' 6\")   Wt 47 kg (103 lb 9.9 oz)   SpO2 95%  Body mass index is 16.72 kg/m².  Physical Exam:        GEN: comfortable, alert and oriented, well nourished, well developed, in no apparent distress   HEENT: NCAT, eyes: pupils equal and reactive, sclera white, EOMIT, good dentition  HEART: limbs warm and well perfused, regular rate, no JVD, no lower extremity edema  LUNGS: speaking in full sentences, not in apparent respiratory distress, no audible " wheezes  MSK: normal tone and bulk, no swelling of the joints, gait steady and normal           Assessment and Plan:   The following treatment plan was discussed        Problem List Items Addressed This Visit       Anxiety     Needs benzo for travel anxiety   Risk benefit              Relevant Medications    diazePAM (VALIUM) 5 MG Tab    Vision problem     Has noticed perminant vision problem for about 6 mo now   Unsure if related to her retinal problem or due to previous head trauma   Has upcoming visit with neuro   And can discuss with her ophthalmologist too at that upcoming visit     30+ min spent            Relevant Orders    Referral to Ophthalmology    Referral to Neurology             Instructed to follow up if symptoms worsen or fail to improve, ER/UC precautions discussed as well    Jenny Blake MD  [facility], Family Medicine   [address] [place] [address]   Tadeo KHAN [address]  Phone: [phone]

## 2023-05-22 ENCOUNTER — HOSPITAL ENCOUNTER (OUTPATIENT)
Facility: MEDICAL CENTER | Age: 68
End: 2023-05-22
Attending: SURGERY
Payer: COMMERCIAL

## 2023-05-22 PROCEDURE — 88305 TISSUE EXAM BY PATHOLOGIST: CPT

## 2023-05-23 LAB — PATHOLOGY CONSULT NOTE: NORMAL

## 2023-06-01 DIAGNOSIS — F32.A DEPRESSION, UNSPECIFIED DEPRESSION TYPE: ICD-10-CM

## 2023-06-01 RX ORDER — VORTIOXETINE 5 MG/1
TABLET, FILM COATED ORAL
Qty: 90 TABLET | Refills: 3 | Status: SHIPPED | OUTPATIENT
Start: 2023-06-01 | End: 2024-02-05 | Stop reason: SDUPTHER

## 2023-06-23 ENCOUNTER — TELEPHONE (OUTPATIENT)
Dept: NEUROLOGY | Facility: MEDICAL CENTER | Age: 68
End: 2023-06-23
Payer: COMMERCIAL

## 2023-06-23 DIAGNOSIS — G43.109 OCULAR MIGRAINE: ICD-10-CM

## 2023-06-23 DIAGNOSIS — G43.709 CHRONIC MIGRAINE WITHOUT AURA WITHOUT STATUS MIGRAINOSUS, NOT INTRACTABLE: ICD-10-CM

## 2023-06-23 RX ORDER — UBROGEPANT 100 MG/1
TABLET ORAL
Qty: 16 TABLET | Refills: 11 | Status: SHIPPED | OUTPATIENT
Start: 2023-06-23 | End: 2024-03-27

## 2023-06-23 RX ORDER — GALCANEZUMAB 120 MG/ML
120 INJECTION, SOLUTION SUBCUTANEOUS
Qty: 1 ML | Refills: 11 | Status: SHIPPED | OUTPATIENT
Start: 2023-06-23 | End: 2024-03-19

## 2023-06-23 NOTE — TELEPHONE ENCOUNTER
Emgality 120 MG/ML SOAJ    Request rec'd from [facility] paid copay $90.00 #3ml/90 DS notifying Ana M. - 06/23/2023 3:18pm    Ubrelvy 100 MG Tabs    Request rec'd from [facility] paid copay $0.00 #10/30 DS Max 30 DS $0.00copay after $30.00 [facility] Voucher (Please mention voucher to patient) notifying AnaM. - 06/23/2023 3:20pm

## 2023-06-26 PROCEDURE — RXMED WILLOW AMBULATORY MEDICATION CHARGE: Performed by: PSYCHIATRY & NEUROLOGY

## 2023-06-27 ENCOUNTER — DOCUMENTATION (OUTPATIENT)
Dept: PHARMACY | Facility: MEDICAL CENTER | Age: 68
End: 2023-06-27
Payer: COMMERCIAL

## 2023-06-27 ENCOUNTER — PHARMACY VISIT (OUTPATIENT)
Dept: PHARMACY | Facility: MEDICAL CENTER | Age: 68
End: 2023-06-27
Payer: COMMERCIAL

## 2023-06-27 NOTE — PROGRESS NOTES
PHARMACIST PRE SCREEN - **TRF Onboarding**  Diagnosis:      Chronic migraine without aura without status migrainosus, not intractable [G43.709]    Ocular migraine [G43.109]    Drug & Non-Drug Allergies: EMR Reviewed. No concerning drug or non-drug allergies.                                                                                          Drug Therapy (name/formulation/dose/route of admin/frequency):  Emgality 120mg/mL solution auto-injector, 1 pen SC Q 4 weeks     EMR Reviewed   - Dose Appropriateness (Y/N):  Y   - Renal or Hepatic Adjustments (Y/N):  N   - Any comorbidities, PMH, precautions, or contraindications that pose a medication safety concern? (Y/N):  N   - Any relevant lab work needed that affects the initial start date? (Y/N):  N/A, patient already on med   - List Past Treatments: aimovig; Ubrelvy; sumatriptan; rizatriptan; TPI; nurtec    - List DDI’s: none    - Patient’s ability to self-administer medication:  No issues identified per EMR   List Goals of Therapy:        To reduce migraine frequency, duration, and severity    To improve acute medication responsiveness and reduce the need for acute attacks    To identify and modify migraine triggers    Patient pre-emptively opt out of clinical services as of 6/27/23

## 2023-07-03 ENCOUNTER — HOSPITAL ENCOUNTER (OUTPATIENT)
Dept: RADIOLOGY | Facility: MEDICAL CENTER | Age: 68
End: 2023-07-03
Attending: OBSTETRICS & GYNECOLOGY
Payer: COMMERCIAL

## 2023-07-19 ENCOUNTER — HOSPITAL ENCOUNTER (OUTPATIENT)
Dept: LAB | Facility: MEDICAL CENTER | Age: 68
End: 2023-07-19
Attending: OBSTETRICS & GYNECOLOGY
Payer: COMMERCIAL

## 2023-07-19 LAB
BASOPHILS # BLD AUTO: 0.5 % (ref 0–1.8)
BASOPHILS # BLD: 0.03 K/UL (ref 0–0.12)
EOSINOPHIL # BLD AUTO: 0.04 K/UL (ref 0–0.51)
EOSINOPHIL NFR BLD: 0.6 % (ref 0–6.9)
ERYTHROCYTE [DISTWIDTH] IN BLOOD BY AUTOMATED COUNT: 47.8 FL (ref 35.9–50)
HCT VFR BLD AUTO: 40.7 % (ref 37–47)
HGB BLD-MCNC: 13.4 G/DL (ref 12–16)
IMM GRANULOCYTES # BLD AUTO: 0.02 K/UL (ref 0–0.11)
IMM GRANULOCYTES NFR BLD AUTO: 0.3 % (ref 0–0.9)
LYMPHOCYTES # BLD AUTO: 1.54 K/UL (ref 1–4.8)
LYMPHOCYTES NFR BLD: 24.7 % (ref 22–41)
MCH RBC QN AUTO: 33.2 PG (ref 27–33)
MCHC RBC AUTO-ENTMCNC: 32.9 G/DL (ref 32.2–35.5)
MCV RBC AUTO: 100.7 FL (ref 81.4–97.8)
MONOCYTES # BLD AUTO: 0.46 K/UL (ref 0–0.85)
MONOCYTES NFR BLD AUTO: 7.4 % (ref 0–13.4)
NEUTROPHILS # BLD AUTO: 4.15 K/UL (ref 1.82–7.42)
NEUTROPHILS NFR BLD: 66.5 % (ref 44–72)
NRBC # BLD AUTO: 0 K/UL
NRBC BLD-RTO: 0 /100 WBC (ref 0–0.2)
PLATELET # BLD AUTO: 220 K/UL (ref 164–446)
PMV BLD AUTO: 11.1 FL (ref 9–12.9)
RBC # BLD AUTO: 4.04 M/UL (ref 4.2–5.4)
TSH SERPL DL<=0.005 MIU/L-ACNC: 1.72 UIU/ML (ref 0.38–5.33)
WBC # BLD AUTO: 6.2 K/UL (ref 4.8–10.8)

## 2023-07-19 PROCEDURE — 36415 COLL VENOUS BLD VENIPUNCTURE: CPT

## 2023-07-19 PROCEDURE — 80053 COMPREHEN METABOLIC PANEL: CPT

## 2023-07-19 PROCEDURE — 84443 ASSAY THYROID STIM HORMONE: CPT

## 2023-07-19 PROCEDURE — 85025 COMPLETE CBC W/AUTO DIFF WBC: CPT

## 2023-07-20 LAB
ALBUMIN SERPL BCP-MCNC: 4.3 G/DL (ref 3.2–4.9)
ALBUMIN/GLOB SERPL: 1.8 G/DL
ALP SERPL-CCNC: 50 U/L (ref 30–99)
ALT SERPL-CCNC: 17 U/L (ref 2–50)
ANION GAP SERPL CALC-SCNC: 9 MMOL/L (ref 7–16)
AST SERPL-CCNC: 18 U/L (ref 12–45)
BILIRUB SERPL-MCNC: 0.7 MG/DL (ref 0.1–1.5)
BUN SERPL-MCNC: 14 MG/DL (ref 8–22)
CALCIUM ALBUM COR SERPL-MCNC: 8.9 MG/DL (ref 8.5–10.5)
CALCIUM SERPL-MCNC: 9.1 MG/DL (ref 8.5–10.5)
CHLORIDE SERPL-SCNC: 105 MMOL/L (ref 96–112)
CO2 SERPL-SCNC: 25 MMOL/L (ref 20–33)
CREAT SERPL-MCNC: 0.77 MG/DL (ref 0.5–1.4)
GFR SERPLBLD CREATININE-BSD FMLA CKD-EPI: 83 ML/MIN/1.73 M 2
GLOBULIN SER CALC-MCNC: 2.4 G/DL (ref 1.9–3.5)
GLUCOSE SERPL-MCNC: 95 MG/DL (ref 65–99)
POTASSIUM SERPL-SCNC: 4.3 MMOL/L (ref 3.6–5.5)
PROT SERPL-MCNC: 6.7 G/DL (ref 6–8.2)
SODIUM SERPL-SCNC: 139 MMOL/L (ref 135–145)

## 2023-07-20 PROCEDURE — RXMED WILLOW AMBULATORY MEDICATION CHARGE: Performed by: PSYCHIATRY & NEUROLOGY

## 2023-07-21 ENCOUNTER — DOCUMENTATION (OUTPATIENT)
Dept: PHARMACY | Facility: MEDICAL CENTER | Age: 68
End: 2023-07-21
Payer: COMMERCIAL

## 2023-07-21 NOTE — PROGRESS NOTES
07/20/23: Spoke with Rosa. She is doing fine on the Ubrelvy 100mg tabs. She reports no side effects to the medication and no new allergies. She reports 0 missed doses, as she only takes as needed and has about 4 tabs on hand. Sending delivery for 07/26, via , 48. $0 copay (#10/30ds). She would like for Combipatch to be filled at [facility] but it was not allowing me to requested a refill. I suggested for her to request a prescription to be sent to [facility] to fill.

## 2023-07-24 ENCOUNTER — PHARMACY VISIT (OUTPATIENT)
Dept: PHARMACY | Facility: MEDICAL CENTER | Age: 68
End: 2023-07-24
Payer: COMMERCIAL

## 2023-07-31 ENCOUNTER — APPOINTMENT (OUTPATIENT)
Dept: ADMISSIONS | Facility: MEDICAL CENTER | Age: 68
End: 2023-07-31
Attending: OBSTETRICS & GYNECOLOGY
Payer: COMMERCIAL

## 2023-08-14 ENCOUNTER — HOSPITAL ENCOUNTER (OUTPATIENT)
Dept: RADIOLOGY | Facility: MEDICAL CENTER | Age: 68
End: 2023-08-14
Attending: FAMILY MEDICINE
Payer: COMMERCIAL

## 2023-08-14 ENCOUNTER — TELEPHONE (OUTPATIENT)
Dept: PHARMACY | Facility: MEDICAL CENTER | Age: 68
End: 2023-08-14
Payer: COMMERCIAL

## 2023-08-14 ENCOUNTER — HOSPITAL ENCOUNTER (OUTPATIENT)
Dept: RADIOLOGY | Facility: MEDICAL CENTER | Age: 68
End: 2023-08-14
Attending: OBSTETRICS & GYNECOLOGY
Payer: COMMERCIAL

## 2023-08-14 DIAGNOSIS — Z12.31 ENCOUNTER FOR SCREENING MAMMOGRAM FOR BREAST CANCER: ICD-10-CM

## 2023-08-14 DIAGNOSIS — Z13.820 SCREENING FOR OSTEOPOROSIS: ICD-10-CM

## 2023-08-14 PROCEDURE — 77080 DXA BONE DENSITY AXIAL: CPT

## 2023-08-14 PROCEDURE — 77063 BREAST TOMOSYNTHESIS BI: CPT

## 2023-08-15 NOTE — TELEPHONE ENCOUNTER
Contact:      Phone number: [phone], Mobile    Name of person spoken with and relationship to patient: Rosa Walker, Self   Patient’s Adherence:      How patient is doing on medication: Okay    How many missed doses and reason: 0    Any new medications: No    Any new conditions: No    Any new allergies: No    Any new side effects: No    Any new diagnoses: No    How many doses remaining: 10 tablets    Did patient want to speak with pharmacist: No  Delivery:      Delivery date and method: Declined a refill of Ubrelvy    Needs by Date: N/A    Signature required: N/A    Any additional details for : N/A  Teach Appointment Date: N/A  Shipping Address: [address] [place] [id number]  Medication (name, strength and dose): Ubrelvy 100mg tabs-1/2 to 1 tab PRN  Copay: N/A  Payment Method: N/A   Supplies: N/A  Additional Information: She states she recently received the shipment of Ubrelvy 100mg tabs at the end of July. She has not had to use any and still has about 10 tablets on hand. She has declined a refill at the time and scheduled a follow up call. Next call date: 09/18 with Emgality refill.

## 2023-09-13 ENCOUNTER — OFFICE VISIT (OUTPATIENT)
Dept: NEUROLOGY | Facility: MEDICAL CENTER | Age: 68
End: 2023-09-13
Attending: PSYCHIATRY & NEUROLOGY
Payer: COMMERCIAL

## 2023-09-13 VITALS
RESPIRATION RATE: 15 BRPM | DIASTOLIC BLOOD PRESSURE: 58 MMHG | OXYGEN SATURATION: 93 % | HEIGHT: 66 IN | SYSTOLIC BLOOD PRESSURE: 94 MMHG | WEIGHT: 104.28 LBS | BODY MASS INDEX: 16.76 KG/M2 | HEART RATE: 71 BPM | TEMPERATURE: 98.5 F

## 2023-09-13 DIAGNOSIS — G43.E09 CHRONIC MIGRAINE WITH AURA: ICD-10-CM

## 2023-09-13 PROCEDURE — 99212 OFFICE O/P EST SF 10 MIN: CPT | Performed by: PSYCHIATRY & NEUROLOGY

## 2023-09-13 PROCEDURE — 3078F DIAST BP <80 MM HG: CPT | Performed by: PSYCHIATRY & NEUROLOGY

## 2023-09-13 PROCEDURE — 3074F SYST BP LT 130 MM HG: CPT | Performed by: PSYCHIATRY & NEUROLOGY

## 2023-09-13 PROCEDURE — 99203 OFFICE O/P NEW LOW 30 MIN: CPT | Performed by: PSYCHIATRY & NEUROLOGY

## 2023-09-13 RX ORDER — VALACYCLOVIR HYDROCHLORIDE 1 G/1
1 TABLET, FILM COATED ORAL EVERY 8 HOURS PRN
COMMUNITY
Start: 2023-08-11

## 2023-09-13 ASSESSMENT — FIBROSIS 4 INDEX: FIB4 SCORE: 1.389067670662634303

## 2023-09-13 NOTE — PROGRESS NOTES
"[facility] NEUROLOGY  GENERAL NEUROLOGY  NEW PATIENT VISIT    Referral source: Jenny Blake MD    CC: \"vision problem\"    HISTORY OF ILLNESS:  Rosa Walker is a 68 y.o. woman with a history most notable for chronic migraine, occipital neuralgia, cervical myofascial pain syndrome, open-angle glaucoma, and anxiety.  Today, she was unaccompanied, and she provided the following history:    The following is a summary of headache symptoms, presented in my standard format:    Family History: father, brothers (x2), children (4 out of 5)  Age at onset (years): 12-14  Location: posterior cervical  Radiation:   Frequency: baseline: 15+/30, lately: 6/30 (most are visual marco alone)  Duration: baseline: unknown, she always takes something, lately: 30 minutes  Headache Days/Month: see above  Quality: \"throbbing\"  Intensity: 5/10  Aura: visual (\"flashes, spotty vision\")  Photophobia/Phonophobia/Nausea/Vomiting: yes/yes/yes/no  Provoked by Physical Activity?:   Triggers: hunger  Associated Symptoms:   Autonomic Signs (such as ptosis, miosis, conjunctival injection, rhinorrhea, increased lacrimation): none  Head Trauma: 2022: struck head on ground, no LOC  Association with Menses: n/a  ED Visits:   Hospitalizations:   Missed Work Days (NP): retired  Sleep (hours/night): 7-8 hours/night  Caffeine Intake: 2 cups/day  Hydration: keeps well-hydrated  Nutrition: eats regularly  Exercise:   Analgesic Overuse:     Current Medication Regimen:  - Emgality: helpful  - Ubrelvy: helpful    Medications Tried: Response  Preventive:  - propranolol: ineffective, associated with intolerable fatigue  - topiramate: made her feel \"really spacy\"  - Aimovig 70: associated with constipation    Rescue:  - sumatriptan: 100 mg ineffective  - Nurtec: worked well, insurance wouldn't cover this    Medications Not Tried:  -     Her current symptoms are \"manageable.\"    MEDICAL AND SURGICAL HISTORY:  Past Medical History:   Diagnosis Date    Arthritis     " Depression 2013    Hot flashes 2013    Psychiatric problem     depression     Past Surgical History:   Procedure Laterality Date    CATARACT PHACO WITH IOL  2012    Performed by GENO ARREDONDO at SURGERY [facility] ORS    CATARACT PHACO WITH IOL  2012    Performed by GENO ARREDONDO at SURGERY [facility] ORS    KNEE ARTHROSCOPY  2010    Performed by FLORI CHILDRESS at SURGERY [facility] ORS    MENISCECTOMY  2010    Performed by FLORI CHILDRESS at SURGERY [facility] ORS    OTHER SURGICAL PROCEDURE  2005    Full thickness skin graft left politeal fossa    RETINAL DETACHMENT REPAIR  3/1992    left     EYE SURGERY       MEDICATIONS:  Current Outpatient Medications   Medication Sig    valacyclovir (VALTREX) 1 GM Tab Take 1 Tablet by mouth every 8 hours as needed.    Galcanezumab-gnlm (EMGALITY) 120 MG/ML Solution Auto-injector Inject 120 mg under the skin every 4 weeks.    Ubrogepant (UBRELVY) 100 MG Tab Take 1/2-1 tablet  at onset of headache, may repeat dose in 2 hours if unrelieved.  Do not exceed more than 2 tablets in 24 hours.    TRINTELLIX 5 MG Tab TAKE ONE TABLET BY MOUTH ONCE DAILY    COMBIPATCH 0.05-0.14 MG/DAY patch     Vortioxetine HBr 5 MG Tab Take 1 Tablet by mouth every day.    Prasterone (INTRAROSA) 6.5 MG INSERT Insert 6.5 mg in vagina.     SOCIAL HISTORY:  Social History     Tobacco Use    Smoking status: Former     Current packs/day: 0.00     Average packs/day: 0.5 packs/day for 10.0 years (5.0 ttl pk-yrs)     Types: Cigarettes     Start date: 1975     Quit date: 1985     Years since quittin.7    Smokeless tobacco: Never   Substance Use Topics    Alcohol use: Yes     Alcohol/week: 3.0 oz     Types: 5 Glasses of wine per week     Comment: 5 glass wine per week     Social History     Social History Narrative    Not on file     FAMILY HISTORY:  Family History   Problem Relation Age of Onset    Cancer Mother 74        ovarian    Other Father          sepsis     REVIEW OF SYSTEMS:  A ROS was completed.  Pertinent positives and negatives were included in the HPI, above.  All other systems were reviewed and are negative.    PHYSICAL EXAM:  General/Medical:  - NAD    Neuro:  MENTAL STATUS: awake and alert; no deficits of speech or language; oriented to conversation; affect was appropriate to situation; pleasant, cooperative    CRANIAL NERVES:    II: acuity: NT, fields: NT, pupils: NT, discs: NT    III/IV/VI: versions: grossly intact    V: facial sensation: NT    VII: facial expression: symmetric    VIII: hearing: intact to voice    IX/X: palate: NT    XI: shoulder shrug: NT    XII: tongue: NT    MOTOR:  - bulk: NT  - tone: NT  Upper Extremity Strength (R/L)    NT   Elbow flexion NT   Elbow extension NT   Shoulder abduction NT     Lower Extremity Strength (R/L)   Hip flexion NT   Knee extension NT   Knee flexion NT   Ankle plantarflexion NT   Ankle dorsiflexion NT     - pronator drift: NT  - abnormal movements: none    SENSATION:  - light touch: NT  - vibration (R/L, seconds): NT at the great toes  - pinprick: NT  - proprioception: NT  - Romberg: NT    COORDINATION:  - finger to nose: NT  - finger tapping: NT    REFLEXES:  Reflex Right Left   BR NT NT   Biceps NT NT   Triceps NT NT   Patellae NT NT   Achilles NT NT   Toes NT NT     GAIT:  - NT    REVIEW OF IMAGING STUDIES:  No recent data available.    REVIEW OF LABORATORY STUDIES:  7/19/2023:  - CBC w/ diff: within acceptable limits    ASSESSMENT:  Rosa Walker is a 68 y.o. woman with chronic migraine with aura as well as occipital neuralgia, cervical myofascial pain syndrome, open-angle glaucoma, and anxiety.  Plans/recommendations as follows:    PLAN:  Chronic Migraine w/ Aura:  Prevention:  - continue Emgality for now  - could consider Botox, if she decides to try this, would treat as follows:  - inject 155 units according to the dosing/injection paradigm currently mandated by the FDA for chronic  migraine as follows:  - 10 units of BOTOX divided into 2 sites into the   - 5 units into the Procerus  - 20 units of Botox divided into 4 units into the Frontalis  - 40 units of Botox divided into 8 sites (4 sites to the right Temporalis and 4 sites to the left Temporalis)  - 30 units divided into 6 units (3 units to the right Occipitalis and 3 units to left Occipitalis)  - 20 units divided into 4 units (2 units to the right Cervical paraspinals, 2 units to the left Cervical paraspinals)  - 30 units of Botox divided into 6 units (3 units to right trapezius, 3 units to the left trapezius).    - could try supplementing:  - magnesium: 400-600 mg once or 200-300 mg twice daily  - riboflavin (vitamin B2): 400 mg once daily  - coenzyme Q10: 300 mg daily  - get 7-9 hours of sleep per night; can try supplementing melatonin 2-10 mg, 2-3 hours before bedtime  - drink plenty of fluids (urine should be nearly clear)  - avoid excessive caffeine intake (no more than 2 servings per day and nothing in the afternoon)  - eat regular meals (don't skip meals)  - get moderate exercise (even just a 20 minute walk daily)    Rescue:  - continue Ubrelvy 100 mg: take this at the onset of aura or headache pain; may re-dose x1 after 2 hours if headache persists; do not use more than 2 days/week  - do not use analgesics (e.g., ibuprofen, acetaminophen) more than 2 days per week in order to avoid analgesic rebound headaches    - keep a headache log    Follow-Up:  - Return in about 6 months (around 3/13/2024).    Signed: Jerrod Phelan M.D.

## 2023-09-14 ENCOUNTER — PRE-ADMISSION TESTING (OUTPATIENT)
Dept: ADMISSIONS | Facility: MEDICAL CENTER | Age: 68
End: 2023-09-14
Attending: OBSTETRICS & GYNECOLOGY
Payer: COMMERCIAL

## 2023-09-14 DIAGNOSIS — Z01.812 PRE-OPERATIVE LABORATORY EXAMINATION: ICD-10-CM

## 2023-09-14 DIAGNOSIS — Z01.810 PRE-OPERATIVE CARDIOVASCULAR EXAMINATION: ICD-10-CM

## 2023-09-18 ENCOUNTER — TELEPHONE (OUTPATIENT)
Dept: PHARMACY | Facility: MEDICAL CENTER | Age: 68
End: 2023-09-18
Payer: COMMERCIAL

## 2023-09-18 ENCOUNTER — PRE-ADMISSION TESTING (OUTPATIENT)
Dept: ADMISSIONS | Facility: MEDICAL CENTER | Age: 68
End: 2023-09-18
Attending: OBSTETRICS & GYNECOLOGY
Payer: COMMERCIAL

## 2023-09-18 DIAGNOSIS — Z01.812 PRE-OPERATIVE LABORATORY EXAMINATION: ICD-10-CM

## 2023-09-18 LAB
ABO + RH BLD: NORMAL
ABO GROUP BLD: NORMAL
ALBUMIN SERPL BCP-MCNC: 4.3 G/DL (ref 3.2–4.9)
ALBUMIN/GLOB SERPL: 1.9 G/DL
ALP SERPL-CCNC: 53 U/L (ref 30–99)
ALT SERPL-CCNC: 11 U/L (ref 2–50)
ANION GAP SERPL CALC-SCNC: 9 MMOL/L (ref 7–16)
APPEARANCE UR: CLEAR
AST SERPL-CCNC: 18 U/L (ref 12–45)
B-HCG SERPL-ACNC: 6.1 MIU/ML (ref 0–5)
BASOPHILS # BLD AUTO: 0.7 % (ref 0–1.8)
BASOPHILS # BLD: 0.03 K/UL (ref 0–0.12)
BILIRUB SERPL-MCNC: 1.2 MG/DL (ref 0.1–1.5)
BILIRUB UR QL STRIP.AUTO: NEGATIVE
BLD GP AB SCN SERPL QL: NORMAL
BUN SERPL-MCNC: 13 MG/DL (ref 8–22)
CALCIUM ALBUM COR SERPL-MCNC: 8.7 MG/DL (ref 8.5–10.5)
CALCIUM SERPL-MCNC: 8.9 MG/DL (ref 8.5–10.5)
CHLORIDE SERPL-SCNC: 103 MMOL/L (ref 96–112)
CO2 SERPL-SCNC: 26 MMOL/L (ref 20–33)
COLOR UR: YELLOW
CREAT SERPL-MCNC: 0.75 MG/DL (ref 0.5–1.4)
EOSINOPHIL # BLD AUTO: 0.04 K/UL (ref 0–0.51)
EOSINOPHIL NFR BLD: 0.9 % (ref 0–6.9)
ERYTHROCYTE [DISTWIDTH] IN BLOOD BY AUTOMATED COUNT: 45.2 FL (ref 35.9–50)
GFR SERPLBLD CREATININE-BSD FMLA CKD-EPI: 85 ML/MIN/1.73 M 2
GLOBULIN SER CALC-MCNC: 2.3 G/DL (ref 1.9–3.5)
GLUCOSE SERPL-MCNC: 94 MG/DL (ref 65–99)
GLUCOSE UR STRIP.AUTO-MCNC: NEGATIVE MG/DL
HCT VFR BLD AUTO: 43.7 % (ref 37–47)
HGB BLD-MCNC: 14.3 G/DL (ref 12–16)
IMM GRANULOCYTES # BLD AUTO: 0.01 K/UL (ref 0–0.11)
IMM GRANULOCYTES NFR BLD AUTO: 0.2 % (ref 0–0.9)
KETONES UR STRIP.AUTO-MCNC: NEGATIVE MG/DL
LEUKOCYTE ESTERASE UR QL STRIP.AUTO: NEGATIVE
LYMPHOCYTES # BLD AUTO: 1.15 K/UL (ref 1–4.8)
LYMPHOCYTES NFR BLD: 26.5 % (ref 22–41)
MCH RBC QN AUTO: 32.1 PG (ref 27–33)
MCHC RBC AUTO-ENTMCNC: 32.7 G/DL (ref 32.2–35.5)
MCV RBC AUTO: 98.2 FL (ref 81.4–97.8)
MICRO URNS: NORMAL
MONOCYTES # BLD AUTO: 0.43 K/UL (ref 0–0.85)
MONOCYTES NFR BLD AUTO: 9.9 % (ref 0–13.4)
NEUTROPHILS # BLD AUTO: 2.68 K/UL (ref 1.82–7.42)
NEUTROPHILS NFR BLD: 61.8 % (ref 44–72)
NITRITE UR QL STRIP.AUTO: NEGATIVE
NRBC # BLD AUTO: 0 K/UL
NRBC BLD-RTO: 0 /100 WBC (ref 0–0.2)
PH UR STRIP.AUTO: 6.5 [PH] (ref 5–8)
PLATELET # BLD AUTO: 235 K/UL (ref 164–446)
PMV BLD AUTO: 10.5 FL (ref 9–12.9)
POTASSIUM SERPL-SCNC: 4.3 MMOL/L (ref 3.6–5.5)
PROT SERPL-MCNC: 6.6 G/DL (ref 6–8.2)
PROT UR QL STRIP: NEGATIVE MG/DL
RBC # BLD AUTO: 4.45 M/UL (ref 4.2–5.4)
RBC UR QL AUTO: NEGATIVE
RH BLD: NORMAL
SODIUM SERPL-SCNC: 138 MMOL/L (ref 135–145)
SP GR UR STRIP.AUTO: 1.01
UROBILINOGEN UR STRIP.AUTO-MCNC: 0.2 MG/DL
WBC # BLD AUTO: 4.3 K/UL (ref 4.8–10.8)

## 2023-09-18 PROCEDURE — 80053 COMPREHEN METABOLIC PANEL: CPT

## 2023-09-18 PROCEDURE — RXMED WILLOW AMBULATORY MEDICATION CHARGE: Performed by: PSYCHIATRY & NEUROLOGY

## 2023-09-18 PROCEDURE — 86850 RBC ANTIBODY SCREEN: CPT

## 2023-09-18 PROCEDURE — 86901 BLOOD TYPING SEROLOGIC RH(D): CPT

## 2023-09-18 PROCEDURE — 86900 BLOOD TYPING SEROLOGIC ABO: CPT

## 2023-09-18 PROCEDURE — 81003 URINALYSIS AUTO W/O SCOPE: CPT

## 2023-09-18 PROCEDURE — 36415 COLL VENOUS BLD VENIPUNCTURE: CPT

## 2023-09-18 PROCEDURE — 84702 CHORIONIC GONADOTROPIN TEST: CPT

## 2023-09-18 PROCEDURE — 85025 COMPLETE CBC W/AUTO DIFF WBC: CPT

## 2023-09-19 NOTE — TELEPHONE ENCOUNTER
Contact:      Phone number: [phone], Mobile    Name of person spoken with and relationship to patient: Rosa Walker, Self   Patient’s Adherence:      How patient is doing on medication: Good    How many missed doses and reason: 0    Any new medications: No    Any new conditions: No    Any new allergies: No    Any new side effects: No    Any new diagnoses: No    How many doses remainin-Emgality (), 4 tabs-Ubrelvy    Did patient want to speak with pharmacist: No  Delivery:      Delivery date and method:  via     Needs by Date:     Signature required: No    Any additional details for : None  Teach Appointment Date: N/A  Shipping Address: [address] [place] [id number]  Medication (name, strength and dose): Emgality 140mg/mL, Ubrelvy 100mg tabs  Copay: $0  Payment Method: N/A, $0 copay   Supplies: None  Additional Information: Next call date: Ubrelvy-10/18, Emgality-.

## 2023-09-21 ENCOUNTER — PHARMACY VISIT (OUTPATIENT)
Dept: PHARMACY | Facility: MEDICAL CENTER | Age: 68
End: 2023-09-21
Payer: COMMERCIAL

## 2023-09-27 NOTE — H&P
Gynecology History and Physical    CC: Desires cancer risk reduction with bilateral salpingo-oophorectomy    HPI: Rosa Walker is a 68-year-old G4, P5 who has a significant family history for reproductive cancer.  Her mother was diagnosed with ovarian cancer.  Rosa has undergone testing for genetic cancer mutation syndromes and while she was negative, she is concerned about her family history and her age related risk.  She had a pelvic ultrasound in 2017 that was negative for any apparent pathology.  She has not had any postmenopausal bleeding.  She does desire bilateral salpingo-oophorectomy for cancer prophylaxis and presents today excited to proceed.    ROS: All other systems reviewed and were found to be negative    PMH: Ocular migraines  PSH: Cataract surgery, knee surgery, retinal surgery, diagnostic laparoscopy  OB Hx: 4 term vaginal deliveries  GYN Hx: She denies history of sexually transmitted infections.  She denies abnormal Pap smears.  Her last Pap was NILM HPV negative in 2017.  FH: Ovarian cancer, colon cancer, stroke  SH: Denies tobacco or illicit drug use.  Consumes alcoholic beverages, approximately 5-7 drinks per week..  She is a retired nurse practitioner.  Medications: Vitamin D, Intrarosa, CombiPatch twice weekly, Emgality  Allergies: NKDA    Physical exam in office on 9/7/2023  /64  Weight 204 pounds  General: No apparent distress  CV: Regular rate and rhythm  Lungs: CTA B  GI: Soft, nontender, nondistended  Extremities: No edema    Assessment:  Family history of ovarian cancer  Desires cancer prophylaxis    Plan:  We discussed the risk, benefits, and alternatives to laparoscopic bilateral salpingo-oophorectomy.  She stated understanding and desire to proceed.  Consent forms are signed.  All questions were answered.    Margi Olivarez M.D.

## 2023-09-28 ENCOUNTER — HOSPITAL ENCOUNTER (OUTPATIENT)
Facility: MEDICAL CENTER | Age: 68
End: 2023-09-28
Attending: OBSTETRICS & GYNECOLOGY | Admitting: OBSTETRICS & GYNECOLOGY
Payer: COMMERCIAL

## 2023-09-28 ENCOUNTER — ANESTHESIA (OUTPATIENT)
Dept: SURGERY | Facility: MEDICAL CENTER | Age: 68
End: 2023-09-28
Payer: COMMERCIAL

## 2023-09-28 ENCOUNTER — ANESTHESIA EVENT (OUTPATIENT)
Dept: SURGERY | Facility: MEDICAL CENTER | Age: 68
End: 2023-09-28
Payer: COMMERCIAL

## 2023-09-28 VITALS
HEIGHT: 66 IN | TEMPERATURE: 97.5 F | HEART RATE: 74 BPM | DIASTOLIC BLOOD PRESSURE: 57 MMHG | SYSTOLIC BLOOD PRESSURE: 112 MMHG | BODY MASS INDEX: 16.48 KG/M2 | OXYGEN SATURATION: 96 % | RESPIRATION RATE: 16 BRPM | WEIGHT: 102.51 LBS

## 2023-09-28 LAB — EKG IMPRESSION: NORMAL

## 2023-09-28 PROCEDURE — 160039 HCHG SURGERY MINUTES - EA ADDL 1 MIN LEVEL 3: Performed by: OBSTETRICS & GYNECOLOGY

## 2023-09-28 PROCEDURE — 700111 HCHG RX REV CODE 636 W/ 250 OVERRIDE (IP): Mod: JZ | Performed by: ANESTHESIOLOGY

## 2023-09-28 PROCEDURE — 700101 HCHG RX REV CODE 250: Performed by: ANESTHESIOLOGY

## 2023-09-28 PROCEDURE — 160009 HCHG ANES TIME/MIN: Performed by: OBSTETRICS & GYNECOLOGY

## 2023-09-28 PROCEDURE — 93005 ELECTROCARDIOGRAM TRACING: CPT | Performed by: OBSTETRICS & GYNECOLOGY

## 2023-09-28 PROCEDURE — 93010 ELECTROCARDIOGRAM REPORT: CPT | Performed by: INTERNAL MEDICINE

## 2023-09-28 PROCEDURE — 160047 HCHG PACU  - EA ADDL 30 MINS PHASE II: Performed by: OBSTETRICS & GYNECOLOGY

## 2023-09-28 PROCEDURE — 700102 HCHG RX REV CODE 250 W/ 637 OVERRIDE(OP): Performed by: ANESTHESIOLOGY

## 2023-09-28 PROCEDURE — 88307 TISSUE EXAM BY PATHOLOGIST: CPT

## 2023-09-28 PROCEDURE — 700105 HCHG RX REV CODE 258: Performed by: ANESTHESIOLOGY

## 2023-09-28 PROCEDURE — 160028 HCHG SURGERY MINUTES - 1ST 30 MINS LEVEL 3: Performed by: OBSTETRICS & GYNECOLOGY

## 2023-09-28 PROCEDURE — 160002 HCHG RECOVERY MINUTES (STAT): Performed by: OBSTETRICS & GYNECOLOGY

## 2023-09-28 PROCEDURE — 160025 RECOVERY II MINUTES (STATS): Performed by: OBSTETRICS & GYNECOLOGY

## 2023-09-28 PROCEDURE — 700101 HCHG RX REV CODE 250: Performed by: OBSTETRICS & GYNECOLOGY

## 2023-09-28 PROCEDURE — 160046 HCHG PACU - 1ST 60 MINS PHASE II: Performed by: OBSTETRICS & GYNECOLOGY

## 2023-09-28 PROCEDURE — 160048 HCHG OR STATISTICAL LEVEL 1-5: Performed by: OBSTETRICS & GYNECOLOGY

## 2023-09-28 PROCEDURE — 160035 HCHG PACU - 1ST 60 MINS PHASE I: Performed by: OBSTETRICS & GYNECOLOGY

## 2023-09-28 PROCEDURE — A9270 NON-COVERED ITEM OR SERVICE: HCPCS | Performed by: ANESTHESIOLOGY

## 2023-09-28 RX ORDER — OXYCODONE HCL 5 MG/5 ML
10 SOLUTION, ORAL ORAL
Status: DISCONTINUED | OUTPATIENT
Start: 2023-09-28 | End: 2023-09-28 | Stop reason: HOSPADM

## 2023-09-28 RX ORDER — CEFAZOLIN SODIUM 1 G/3ML
INJECTION, POWDER, FOR SOLUTION INTRAMUSCULAR; INTRAVENOUS PRN
Status: DISCONTINUED | OUTPATIENT
Start: 2023-09-28 | End: 2023-09-28 | Stop reason: SURG

## 2023-09-28 RX ORDER — GABAPENTIN 300 MG/1
300 CAPSULE ORAL ONCE
Status: COMPLETED | OUTPATIENT
Start: 2023-09-28 | End: 2023-09-28

## 2023-09-28 RX ORDER — HYDROMORPHONE HYDROCHLORIDE 1 MG/ML
0.4 INJECTION, SOLUTION INTRAMUSCULAR; INTRAVENOUS; SUBCUTANEOUS
Status: DISCONTINUED | OUTPATIENT
Start: 2023-09-28 | End: 2023-09-28 | Stop reason: HOSPADM

## 2023-09-28 RX ORDER — SODIUM CHLORIDE, SODIUM LACTATE, POTASSIUM CHLORIDE, CALCIUM CHLORIDE 600; 310; 30; 20 MG/100ML; MG/100ML; MG/100ML; MG/100ML
INJECTION, SOLUTION INTRAVENOUS CONTINUOUS
Status: DISCONTINUED | OUTPATIENT
Start: 2023-09-28 | End: 2023-09-28

## 2023-09-28 RX ORDER — HYDROMORPHONE HYDROCHLORIDE 1 MG/ML
0.1 INJECTION, SOLUTION INTRAMUSCULAR; INTRAVENOUS; SUBCUTANEOUS
Status: DISCONTINUED | OUTPATIENT
Start: 2023-09-28 | End: 2023-09-28 | Stop reason: HOSPADM

## 2023-09-28 RX ORDER — ONDANSETRON 2 MG/ML
INJECTION INTRAMUSCULAR; INTRAVENOUS PRN
Status: DISCONTINUED | OUTPATIENT
Start: 2023-09-28 | End: 2023-09-28 | Stop reason: SURG

## 2023-09-28 RX ORDER — HYDRALAZINE HYDROCHLORIDE 20 MG/ML
5 INJECTION INTRAMUSCULAR; INTRAVENOUS
Status: DISCONTINUED | OUTPATIENT
Start: 2023-09-28 | End: 2023-09-28 | Stop reason: HOSPADM

## 2023-09-28 RX ORDER — CELECOXIB 200 MG/1
200 CAPSULE ORAL ONCE
Status: COMPLETED | OUTPATIENT
Start: 2023-09-28 | End: 2023-09-28

## 2023-09-28 RX ORDER — EPINEPHRINE 1 MG/ML(1)
AMPUL (ML) INJECTION
Status: DISCONTINUED
Start: 2023-09-28 | End: 2023-09-28 | Stop reason: HOSPADM

## 2023-09-28 RX ORDER — BUPIVACAINE HYDROCHLORIDE AND EPINEPHRINE 2.5; 5 MG/ML; UG/ML
INJECTION, SOLUTION EPIDURAL; INFILTRATION; INTRACAUDAL; PERINEURAL
Status: DISCONTINUED | OUTPATIENT
Start: 2023-09-28 | End: 2023-09-28 | Stop reason: HOSPADM

## 2023-09-28 RX ORDER — DEXAMETHASONE SODIUM PHOSPHATE 4 MG/ML
INJECTION, SOLUTION INTRA-ARTICULAR; INTRALESIONAL; INTRAMUSCULAR; INTRAVENOUS; SOFT TISSUE PRN
Status: DISCONTINUED | OUTPATIENT
Start: 2023-09-28 | End: 2023-09-28 | Stop reason: SURG

## 2023-09-28 RX ORDER — PHENYLEPHRINE HCL IN 0.9% NACL 0.5 MG/5ML
SYRINGE (ML) INTRAVENOUS PRN
Status: DISCONTINUED | OUTPATIENT
Start: 2023-09-28 | End: 2023-09-28 | Stop reason: SURG

## 2023-09-28 RX ORDER — EPHEDRINE SULFATE 50 MG/ML
5 INJECTION, SOLUTION INTRAVENOUS
Status: DISCONTINUED | OUTPATIENT
Start: 2023-09-28 | End: 2023-09-28 | Stop reason: HOSPADM

## 2023-09-28 RX ORDER — OXYCODONE HCL 5 MG/5 ML
5 SOLUTION, ORAL ORAL
Status: DISCONTINUED | OUTPATIENT
Start: 2023-09-28 | End: 2023-09-28 | Stop reason: HOSPADM

## 2023-09-28 RX ORDER — ROCURONIUM BROMIDE 10 MG/ML
INJECTION, SOLUTION INTRAVENOUS PRN
Status: DISCONTINUED | OUTPATIENT
Start: 2023-09-28 | End: 2023-09-28 | Stop reason: SURG

## 2023-09-28 RX ORDER — HYDROMORPHONE HYDROCHLORIDE 2 MG/ML
INJECTION, SOLUTION INTRAMUSCULAR; INTRAVENOUS; SUBCUTANEOUS PRN
Status: DISCONTINUED | OUTPATIENT
Start: 2023-09-28 | End: 2023-09-28 | Stop reason: SURG

## 2023-09-28 RX ORDER — SODIUM CHLORIDE, SODIUM LACTATE, POTASSIUM CHLORIDE, CALCIUM CHLORIDE 600; 310; 30; 20 MG/100ML; MG/100ML; MG/100ML; MG/100ML
INJECTION, SOLUTION INTRAVENOUS
Status: DISCONTINUED | OUTPATIENT
Start: 2023-09-28 | End: 2023-09-28 | Stop reason: SURG

## 2023-09-28 RX ORDER — ACETAMINOPHEN 500 MG
1000 TABLET ORAL ONCE
Status: COMPLETED | OUTPATIENT
Start: 2023-09-28 | End: 2023-09-28

## 2023-09-28 RX ORDER — HYDROMORPHONE HYDROCHLORIDE 1 MG/ML
0.2 INJECTION, SOLUTION INTRAMUSCULAR; INTRAVENOUS; SUBCUTANEOUS
Status: DISCONTINUED | OUTPATIENT
Start: 2023-09-28 | End: 2023-09-28 | Stop reason: HOSPADM

## 2023-09-28 RX ORDER — LIDOCAINE HYDROCHLORIDE 20 MG/ML
INJECTION, SOLUTION EPIDURAL; INFILTRATION; INTRACAUDAL; PERINEURAL PRN
Status: DISCONTINUED | OUTPATIENT
Start: 2023-09-28 | End: 2023-09-28 | Stop reason: SURG

## 2023-09-28 RX ORDER — SODIUM CHLORIDE, SODIUM LACTATE, POTASSIUM CHLORIDE, CALCIUM CHLORIDE 600; 310; 30; 20 MG/100ML; MG/100ML; MG/100ML; MG/100ML
INJECTION, SOLUTION INTRAVENOUS CONTINUOUS
Status: DISCONTINUED | OUTPATIENT
Start: 2023-09-28 | End: 2023-09-28 | Stop reason: HOSPADM

## 2023-09-28 RX ORDER — BUPIVACAINE HYDROCHLORIDE 2.5 MG/ML
INJECTION, SOLUTION EPIDURAL; INFILTRATION; INTRACAUDAL
Status: DISCONTINUED
Start: 2023-09-28 | End: 2023-09-28 | Stop reason: HOSPADM

## 2023-09-28 RX ORDER — ONDANSETRON 2 MG/ML
4 INJECTION INTRAMUSCULAR; INTRAVENOUS
Status: COMPLETED | OUTPATIENT
Start: 2023-09-28 | End: 2023-09-28

## 2023-09-28 RX ORDER — LIDOCAINE HYDROCHLORIDE 40 MG/ML
SOLUTION TOPICAL PRN
Status: DISCONTINUED | OUTPATIENT
Start: 2023-09-28 | End: 2023-09-28 | Stop reason: SURG

## 2023-09-28 RX ORDER — HALOPERIDOL 5 MG/ML
1 INJECTION INTRAMUSCULAR
Status: DISCONTINUED | OUTPATIENT
Start: 2023-09-28 | End: 2023-09-28 | Stop reason: HOSPADM

## 2023-09-28 RX ADMIN — ONDANSETRON 4 MG: 2 INJECTION INTRAMUSCULAR; INTRAVENOUS at 17:59

## 2023-09-28 RX ADMIN — Medication 100 MCG: at 15:39

## 2023-09-28 RX ADMIN — LIDOCAINE HYDROCHLORIDE 4 ML: 40 SOLUTION TOPICAL at 15:32

## 2023-09-28 RX ADMIN — SUGAMMADEX 200 MG: 100 INJECTION, SOLUTION INTRAVENOUS at 16:12

## 2023-09-28 RX ADMIN — ONDANSETRON 4 MG: 2 INJECTION INTRAMUSCULAR; INTRAVENOUS at 16:12

## 2023-09-28 RX ADMIN — HYDROMORPHONE HYDROCHLORIDE 1 MG: 2 INJECTION INTRAMUSCULAR; INTRAVENOUS; SUBCUTANEOUS at 16:07

## 2023-09-28 RX ADMIN — GABAPENTIN 300 MG: 300 CAPSULE ORAL at 14:03

## 2023-09-28 RX ADMIN — Medication 100 MCG: at 16:14

## 2023-09-28 RX ADMIN — FENTANYL CITRATE 50 MCG: 50 INJECTION, SOLUTION INTRAMUSCULAR; INTRAVENOUS at 15:58

## 2023-09-28 RX ADMIN — ROCURONIUM BROMIDE 50 MG: 50 INJECTION, SOLUTION INTRAVENOUS at 15:32

## 2023-09-28 RX ADMIN — FENTANYL CITRATE 50 MCG: 50 INJECTION, SOLUTION INTRAMUSCULAR; INTRAVENOUS at 15:32

## 2023-09-28 RX ADMIN — CELECOXIB 200 MG: 200 CAPSULE ORAL at 14:03

## 2023-09-28 RX ADMIN — SODIUM CHLORIDE, POTASSIUM CHLORIDE, SODIUM LACTATE AND CALCIUM CHLORIDE: 600; 310; 30; 20 INJECTION, SOLUTION INTRAVENOUS at 15:28

## 2023-09-28 RX ADMIN — LIDOCAINE HYDROCHLORIDE 40 MG: 20 INJECTION, SOLUTION EPIDURAL; INFILTRATION; INTRACAUDAL at 15:32

## 2023-09-28 RX ADMIN — DEXAMETHASONE SODIUM PHOSPHATE 4 MG: 4 INJECTION INTRA-ARTICULAR; INTRALESIONAL; INTRAMUSCULAR; INTRAVENOUS; SOFT TISSUE at 15:35

## 2023-09-28 RX ADMIN — ACETAMINOPHEN 1000 MG: 500 TABLET, FILM COATED ORAL at 14:03

## 2023-09-28 RX ADMIN — PROPOFOL 120 MG: 10 INJECTION, EMULSION INTRAVENOUS at 15:32

## 2023-09-28 RX ADMIN — CEFAZOLIN 2 G: 1 INJECTION, POWDER, FOR SOLUTION INTRAMUSCULAR; INTRAVENOUS at 15:35

## 2023-09-28 ASSESSMENT — PAIN SCALES - GENERAL: PAIN_LEVEL: 2

## 2023-09-28 ASSESSMENT — FIBROSIS 4 INDEX: FIB4 SCORE: 1.62

## 2023-09-28 NOTE — OP REPORT
Operative Note    Pre Operative Diagnosis:  Family history of ovarian cancer  Desires cancer prophylaxis    Post Operative Diagnosis:   Family history of ovarian cancer  Desires cancer prophylaxis    Procedure:Laparoscopic bilateral salpingo-oophorectomy    Surgeon:Juanis THOMAS    Assistant: Cee THOMAS    Intraoperative findings:Normal appearing uterus, normal appearing fallopian tubes bilaterally, small, postmenopausal ovaries bilaterally, normal appearing liver edge and gallbladder, normal appearing appendix    Anesthesia:General    EBL:Minimal    IVF:300cc    UOP:50cc    Specimens:Bilateral fallopian tubes and ovaries for serial sectioning    Complications:none    Dispo: to PACU    Indications and Consent: Rosa Walker is a 68-year-old G4, P5 who has a significant family history for reproductive cancer.  She desires bilateral salpingo oophorectomy for risk reduction. We discussed the risks, benefits, and alternatives to laparoscopic bilateral salpingo-oophorectomy. She states understanding and desires to proceed. Consent forms were signed.     Procedure in Detail: The patient was taken back to the operating room where general anesthesia was administered without difficulty. Her bladder was drained with a straight catheter. She was prepped and draped in supine position. A time out was performed.  Quarter percent marcaine was injected in the infraumbilical skin subcutaneously. A 5mm incision was made in the infraumbilical skin with a scalpel. The 0 degree 5mm laparoscope was advanced under direct visualization. Intraperitoneal placement was confirmed and pneumosufflation was achieved to 15mmHg. Intraabdominal survey was performed and the above findings were noted. A 10mm left lower quadrant and 5 mm right lower quadrant assistant ports were placed under direct visualization. The right fallopian tube and ovary were identified. The fallopian tube was followed out to the fimbria. The right infundibulopelvic ligament was  identified. The fallopian tube was grasped with a Prestige and elevated to examine the pelvic sidewall. The ureter was seen coursing along the pelvic sidewall, well below the IP ligament. The right IP was ligated with the Ligasure. The ovary and fallopian tube were sequentially coagulated and cut traveling along the broad ligament until the utero-ovarian ligament could ligated approximately one centimeter away from the uterine cornu. The left fallopian tube and ovary  were similarly identified. The fallopian tube was followed out to the fimbria. The left infundibulopelvic ligament was identified. The fallopian tube was grasped with a Prestige and elevated to examine the pelvic sidewall. The ureter was seen coursing along the pelvic sidewall, well below the IP ligament. The left IP was ligated with the Ligasure. The ovary and fallopian tube were sequentially coagulated and cut traveling along the broad ligament until the utero-ovarian ligament could ligated approximately one centimeter away from the uterine cornu.  Both specimens were placed in an Endocatch bag and removed to be sent to pathology. The pedicles were examined and found to be hemostatic. The ureters were both undergoing appropriate peristalsis. Pneumosufflation was released.  The port sites were removed. The fascia of the 10mm port was re-approximated with O Vicryl. All ports were closed subcutaneously with 4.0 Monocryl and the skin was closed with Dermabond.  All counts were correct x2.     Margi Olivarez M.D.

## 2023-09-28 NOTE — ANESTHESIA PREPROCEDURE EVALUATION
Case: [id number] Date/Time: 09/28/23 1445    Procedure: LAPAROSCOPICALLY ASSISTED BILATERAL SALPINGO OOPHORECTOMY    Pre-op diagnosis: FAMILY HISTORY OF OVARIAN CANCER    Location: [facility] ROOM 25 / SURGERY SAME DAY [facility]    Surgeons: Margi Olivarez M.D.          Relevant Problems   NEURO   (positive) Bilateral occipital neuralgia   (positive) Chronic migraine with aura   (positive) Migraine   (positive) Ocular migraine      CARDIAC   (positive) Chronic migraine with aura   (positive) Hot flashes   (positive) Migraine   (positive) Ocular migraine      Other   (positive) Anxiety   (positive) Depression   (positive) Open-angle glaucoma       Physical Exam    Airway   Mallampati: II  TM distance: >3 FB  Neck ROM: full       Cardiovascular   Rhythm: regular  Rate: normal     Dental - normal exam           Pulmonary    Abdominal   (-) obese     Neurological - normal exam                 Anesthesia Plan    ASA 2       Plan - general       Airway plan will be ETT          Induction: intravenous    Postoperative Plan: Postoperative administration of opioids is intended.    Pertinent diagnostic labs and testing reviewed    Informed Consent:    Anesthetic plan and risks discussed with patient.    Use of blood products discussed with: patient whom consented to blood products.

## 2023-09-28 NOTE — OR NURSING
1628 - Pt to PACU from OR. Report from anesthesia and OR RN. Respirations even and unlabored. VSS. Oral airway in place    1650 oral airway dc    1651 Dr. Olivarez at bedside    1708 Discharge instructions discussed with pt's .     1801 up to bathroom to void, void not successful, patient became nauseous, zofran given per EMAR     1835 per Dr. Olivarez okay for patient to dc home without void, education provided on s/s of retention, patient/ verbalizes understanding and will notify MD of any issues with voiding, vss, denies pain and nausea, patient dressed with assistance, piv dc tip intact, all belongings with patient and accounted for, patient safely dc home to care of .

## 2023-09-28 NOTE — ANESTHESIA TIME REPORT
Anesthesia Start and Stop Event Times     Date Time Event    9/28/2023 1520 Ready for Procedure     1528 Anesthesia Start     1630 Anesthesia Stop        Responsible Staff  09/28/23    Name Role Begin End    George Bassett M.D. Anesth 1528 1630        Overtime Reason:  no overtime (within assigned shift)    Comments:

## 2023-09-28 NOTE — ANESTHESIA PROCEDURE NOTES
Airway    Date/Time: 9/28/2023 3:32 PM    Performed by: George Bassett M.D.  Authorized by: George Bassett M.D.    Location:  OR  Urgency:  Elective  Indications for Airway Management:  Anesthesia      Spontaneous Ventilation: absent    Sedation Level:  Deep  Preoxygenated: Yes    Patient Position:  Sniffing  Mask Difficulty Assessment:  0 - not attempted  Final Airway Type:  Endotracheal airway  Final Endotracheal Airway:  ETT  Cuffed: Yes    Technique Used for Successful ETT Placement:  Direct laryngoscopy  Devices/Methods Used in Placement:  Intubating stylet    Insertion Site:  Oral  Blade Type:  Live  Laryngoscope Blade/Videolaryngoscope Blade Size:  2  ETT Size (mm):  6.0  Measured from:  Teeth  ETT to Teeth (cm):  21  Placement Verified by: auscultation and capnometry    Cormack-Lehane Classification:  Grade I - full view of glottis  Number of Attempts at Approach:  1

## 2023-09-29 LAB — PATHOLOGY CONSULT NOTE: NORMAL

## 2023-09-29 NOTE — DISCHARGE INSTRUCTIONS
What to Expect Post Anesthesia    Rest and take it easy for the first 24 hours.  A responsible adult is recommended to remain with you during that time.  It is normal to feel sleepy.  We encourage you to not do anything that requires balance, judgment or coordination.    FOR 24 HOURS DO NOT:  Drive, operate machinery or run household appliances.  Drink beer or alcoholic beverages.  Make important decisions or sign legal documents.    To avoid nausea, slowly advance diet as tolerated, avoiding spicy or greasy foods for the first day.  Add more substantial food to your diet according to your provider's instructions.  INCREASE FLUIDS AND FIBER TO AVOID CONSTIPATION.    MILD FLU-LIKE SYMPTOMS ARE NORMAL.  YOU MAY EXPERIENCE GENERALIZED MUSCLE ACHES, THROAT IRRITATION, HEADACHE AND/OR SOME NAUSEA.

## 2023-09-29 NOTE — ANESTHESIA POSTPROCEDURE EVALUATION
Patient: Rosa Walker    Procedure Summary     Date: 09/28/23 Room / Location: [facility] ROOM 25 / SURGERY SAME DAY [facility]    Anesthesia Start: 1528 Anesthesia Stop: 1630    Procedure: LAPAROSCOPICALLY ASSISTED BILATERAL SALPINGO OOPHORECTOMY (Bilateral: Abdomen) Diagnosis: (FAMILY HISTORY OF OVARIAN CANCER / PROPHYLAXIS)    Surgeons: Margi Olivarez M.D. Responsible Provider: George Bassett M.D.    Anesthesia Type: general ASA Status: 2          Final Anesthesia Type: general  Last vitals  BP   Blood Pressure : 119/57    Temp   37.2 °C (99 °F)    Pulse   88   Resp   16    SpO2   92 %      Anesthesia Post Evaluation    Patient location during evaluation: PACU  Patient participation: complete - patient participated  Level of consciousness: awake  Pain score: 2    Airway patency: patent  Anesthetic complications: no  Cardiovascular status: adequate  Respiratory status: acceptable  Hydration status: stable    PONV: controlled          No notable events documented.     Nurse Pain Score: 0 (NPRS)

## 2023-10-03 ENCOUNTER — OFFICE VISIT (OUTPATIENT)
Dept: MEDICAL GROUP | Facility: MEDICAL CENTER | Age: 68
End: 2023-10-03
Payer: COMMERCIAL

## 2023-10-03 VITALS
SYSTOLIC BLOOD PRESSURE: 106 MMHG | BODY MASS INDEX: 16.51 KG/M2 | HEIGHT: 66 IN | OXYGEN SATURATION: 98 % | WEIGHT: 102.73 LBS | TEMPERATURE: 98.3 F | HEART RATE: 78 BPM | DIASTOLIC BLOOD PRESSURE: 66 MMHG

## 2023-10-03 DIAGNOSIS — Z80.41 FAMILY HISTORY OF MALIGNANT NEOPLASM OF OVARY: ICD-10-CM

## 2023-10-03 DIAGNOSIS — R94.31 ABNORMAL EKG: ICD-10-CM

## 2023-10-03 PROCEDURE — 3074F SYST BP LT 130 MM HG: CPT | Performed by: FAMILY MEDICINE

## 2023-10-03 PROCEDURE — 3078F DIAST BP <80 MM HG: CPT | Performed by: FAMILY MEDICINE

## 2023-10-03 PROCEDURE — 99213 OFFICE O/P EST LOW 20 MIN: CPT | Performed by: FAMILY MEDICINE

## 2023-10-03 ASSESSMENT — FIBROSIS 4 INDEX: FIB4 SCORE: 1.62

## 2023-10-03 NOTE — ASSESSMENT & PLAN NOTE
Did 2 EKGs in preop where cardiology interpretation mentioned possible old infarct   She would like to review with cardiology /consult with cardiology and I feel that this is very appropriate       Referral placed

## 2023-10-03 NOTE — PROGRESS NOTES
This medical record contains text that has been entered with the assistance of computer voice recognition and dictation software.  Therefore, it may contain unintended errors in text, spelling, punctuation, or grammar        Chief Complaint   Patient presents with    Lab Results     EKG results        Rosa Walker is a 68 y.o. female here evaluation and management of:       Current Outpatient Medications   Medication Sig Dispense Refill    valacyclovir (VALTREX) 1 GM Tab Take 1 Tablet by mouth every 8 hours as needed.      Galcanezumab-gnlm (EMGALITY) 120 MG/ML Solution Auto-injector Inject 120 mg under the skin every 4 weeks. 1 mL 11    Ubrogepant (UBRELVY) 100 MG Tab Take 1/2-1 tablet  at onset of headache, may repeat dose in 2 hours if unrelieved.  Do not exceed more than 2 tablets in 24 hours. 16 Tablet 11    TRINTELLIX 5 MG Tab TAKE ONE TABLET BY MOUTH ONCE DAILY 90 Tablet 3    COMBIPATCH 0.05-0.14 MG/DAY patch two times a week.      Prasterone (INTRAROSA) 6.5 MG INSERT Insert 6.5 mg into the vagina as needed.      Vortioxetine HBr 5 MG Tab Take 1 Tablet by mouth every day. 90 Tablet 3     No current facility-administered medications for this visit.     Patient Active Problem List    Diagnosis Date Noted    Abnormal EKG 10/03/2023    Vision problem 03/14/2023    Right knee pain 02/24/2023    Anxiety 02/24/2023    Preventative health care 05/06/2022    Cervicalgia 06/15/2021    Cervical myofascial pain syndrome 06/15/2021    Bilateral occipital neuralgia 06/15/2021    Chronic migraine with aura 11/12/2020    Ocular migraine 11/12/2020    Migraine 09/30/2020    Atrophic vaginitis 05/12/2020    Open-angle glaucoma 08/17/2018    Osteopenia of necks of both femurs 08/17/2018    Annual physical exam 08/17/2018    Neck pain 10/02/2014    Family history of ovarian cancer 10/02/2014    Depression 07/16/2013    Hot flashes 07/16/2013     Past Surgical History:   Procedure Laterality Date    VA LAP,DIAGNOSTIC  "ABDOMEN Bilateral 2023    Procedure: LAPAROSCOPICALLY ASSISTED BILATERAL SALPINGO OOPHORECTOMY;  Surgeon: Margi Olivarez M.D.;  Location: SURGERY SAME DAY [facility];  Service: Gynecology    CATARACT PHACO WITH IOL  2012    Performed by GENO ARREDONDO at SURGERY [facility]    CATARACT PHACO WITH IOL  2012    Performed by GENO ARREDONDO at SURGERY [facility]    KNEE ARTHROSCOPY  2010    Performed by FLORI CHILDRESS at SURGERY [facility]    MENISCECTOMY  2010    Performed by FLORI CHILDRESS at SURGERY [facility]    OTHER SURGICAL PROCEDURE  2005    Full thickness skin graft left politeal fossa    RETINAL DETACHMENT REPAIR  1992    left     EYE SURGERY      SCLERAL BUCKLING Left       Social History     Tobacco Use    Smoking status: Former     Current packs/day: 0.00     Average packs/day: 0.5 packs/day for 10.0 years (5.0 ttl pk-yrs)     Types: Cigarettes     Start date: 1975     Quit date: 1985     Years since quittin.7     Passive exposure: Never    Smokeless tobacco: Never   Vaping Use    Vaping Use: Never used   Substance Use Topics    Alcohol use: Yes     Alcohol/week: 3.0 oz     Types: 5 Glasses of wine per week     Comment: 5 glass wine per week    Drug use: No     Family History   Problem Relation Age of Onset    Cancer Mother 74        ovarian    Other Father         sepsis           ROS    all review of system completed and negative except for those listed above     Objective:     /66 (BP Location: Left arm, Patient Position: Sitting, BP Cuff Size: Adult)   Pulse 78   Temp 36.8 °C (98.3 °F) (Temporal)   Ht 1.676 m (5' 6\")   Wt 46.6 kg (102 lb 11.8 oz)   SpO2 98%  Body mass index is 16.58 kg/m².  Physical Exam:      GEN: comfortable, alert and oriented, well nourished, well developed, in no apparent distress   HEENT: NCAT, eyes: pupils equal and reactive, sclera white, EOMIT, good dentition  HEART: limbs warm " and well perfused, regular rate, no JVD, no lower extremity edema  LUNGS: speaking in full sentences, not in apparent respiratory distress, no audible wheezes  MSK: normal tone and bulk, no swelling of the joints, gait steady and normal           Assessment and Plan:   The following treatment plan was discussed        Problem List Items Addressed This Visit       Family history of ovarian cancer     Recently (<1 week ago) did hysterectomy oofrectomy with gyn   We discuss this at length                Abnormal EKG     Did 2 EKGs in preop where cardiology interpretation mentioned possible old infarct   She would like to review with cardiology /consult with cardiology and I feel that this is very appropriate       Referral placed              Relevant Orders    REFERRAL TO CARDIOLOGY             Instructed to follow up if symptoms worsen or fail to improve, ER/UC precautions discussed as well    Jenny Blake MD  [facility], Family Medicine   [address] [place] [address]   Tadeo KHAN [address]  Phone: [phone]

## 2023-10-17 ENCOUNTER — TELEPHONE (OUTPATIENT)
Dept: PHARMACY | Facility: MEDICAL CENTER | Age: 68
End: 2023-10-17
Payer: COMMERCIAL

## 2023-10-17 PROCEDURE — RXMED WILLOW AMBULATORY MEDICATION CHARGE: Performed by: PSYCHIATRY & NEUROLOGY

## 2023-10-18 NOTE — TELEPHONE ENCOUNTER
Contact:      Phone number: [phone], Mobile    Name of person spoken with and relationship to patient: Rosa Walker, Self  Patient’s Adherence:      How patient is doing on medication: Good    How many missed doses and reason: 0, PRN    Any new medications: No    Any new conditions: No    Any new allergies: No    Any new side effects: No    Any new diagnoses: No    How many doses remainin tablets    Did patient want to speak with pharmacist: No  Delivery:      Delivery date and method: 10/19 via     Needs by Date: N/A    Signature required: No    Any additional details for : None   Teach Appointment Date: N/A  Shipping Address: [address] [place] [address]  Medication (name, strength and dose): Ubrelvy 100mg tabs-1 PRN  Copay: $0/30ds  Payment Method: N/A, $0 copay   Supplies: None  Additional Information: Next call date: .

## 2023-10-19 ENCOUNTER — PHARMACY VISIT (OUTPATIENT)
Dept: PHARMACY | Facility: MEDICAL CENTER | Age: 68
End: 2023-10-19
Payer: COMMERCIAL

## 2023-11-15 ENCOUNTER — TELEPHONE (OUTPATIENT)
Dept: PHARMACY | Facility: MEDICAL CENTER | Age: 68
End: 2023-11-15
Payer: COMMERCIAL

## 2023-11-15 PROCEDURE — RXMED WILLOW AMBULATORY MEDICATION CHARGE: Performed by: PSYCHIATRY & NEUROLOGY

## 2023-11-15 NOTE — TELEPHONE ENCOUNTER
Contact:   [id number]  Rosa Walker    (goes by Isabella)    Phone number: [phone]    Name of person spoken with and relationship to patient:  Isabella, self   Patient’s Adherence:            How patient is doing on medication:  well    How many missed doses and reason: 0    Any new medications: no    Any new conditions: no    Any new allergies: no    Any new side effects: no     Any new diagnoses: no     How many doses remainin    Did patient want to speak with pharmacist: no  Delivery:            Delivery date and method:  ship  [employer] (informed approx. 3-5 business days)    Needs by Date:      Signature required:  no    Any additional details for :  matt Mazariegos Appointment Date:  na  Shipping Address:  [address] [place] [address]  Medication(name, strength and dose):  Ubrelvy 100 MG Tabs 1/2-1 tablet  at onset of headache, may repeat dose in 2 hours if unrelieved.  Do not exceed more than 2 tablets in 24 hours.  Copay: $0  Payment Method: na  Supplies:  na  Additional info:  SYD Mason. She stated doing well on the medication. She has been informed of the back order on Emgality. placed in basket to start on waitlist.  No further questions/concerns at this time

## 2023-11-16 ENCOUNTER — PHARMACY VISIT (OUTPATIENT)
Dept: PHARMACY | Facility: MEDICAL CENTER | Age: 68
End: 2023-11-16
Payer: COMMERCIAL

## 2023-11-30 PROCEDURE — RXMED WILLOW AMBULATORY MEDICATION CHARGE: Performed by: PSYCHIATRY & NEUROLOGY

## 2023-12-06 ENCOUNTER — TELEPHONE (OUTPATIENT)
Dept: PHARMACY | Facility: MEDICAL CENTER | Age: 68
End: 2023-12-06
Payer: COMMERCIAL

## 2023-12-06 NOTE — TELEPHONE ENCOUNTER
Contact:  [id number]   Rosa Walker        Phone number: [phone]    Name of person spoken with and relationship to patient: Rosa, estela   Patient’s Adherence:            How patient is doing on medication:  well    How many missed doses and reason: 0    Any new medications: no    Any new conditions: no    Any new allergies: no    Any new side effects: no     Any new diagnoses: no     How many doses remaining:  emgality (0), Ubrelvy (9)    Did patient want to speak with pharmacist: no  Delivery:            Delivery date and method:  12/8     Needs by Date:  12/8    Signature required:  no    Any additional details for :  may leave at door if no answer  Teach Appointment Date:  na  Shipping Address: [address] [place] [id number]   Medication(name, strength and dose):  [id number] / Emgality 120 MG/ML SubQ. Inject every 4 weeks, Ubrelvy 100 MG Tabs 1/2-1 tablet  at onset of headache, may repeat dose in 2 hours if unrelieved.  Do not exceed more than 2 tablets in 24 hours.  Copay: $0  Payment Method: na  Supplies:  na  Additional info:  SYD Lee. She stated doing well on the medication. She will fill both so she doesn't have to worry about deliveries during the holidays. No further questions/concerns at this time

## 2023-12-08 ENCOUNTER — TELEPHONE (OUTPATIENT)
Dept: PHARMACY | Facility: MEDICAL CENTER | Age: 68
End: 2023-12-08
Payer: COMMERCIAL

## 2023-12-08 ENCOUNTER — PHARMACY VISIT (OUTPATIENT)
Dept: PHARMACY | Facility: MEDICAL CENTER | Age: 68
End: 2023-12-08
Payer: COMMERCIAL

## 2023-12-08 PROCEDURE — RXMED WILLOW AMBULATORY MEDICATION CHARGE: Performed by: PSYCHIATRY & NEUROLOGY

## 2023-12-08 NOTE — TELEPHONE ENCOUNTER
Prior Authorization Renewal for UBRELVY 100MG TABLET has been approved for a quantity of 10 tablets , day supply 30    Prior Authorization reference number: [id number]  Insurance: [employer]  Effective dates: 12/06/2023 thru 12/07/2024  Copay: $0.00     Is patient eligible to fill with [facility]? Yes    Next Steps:  no further action needed

## 2023-12-13 ENCOUNTER — TELEPHONE (OUTPATIENT)
Dept: PHARMACY | Facility: MEDICAL CENTER | Age: 68
End: 2023-12-13
Payer: COMMERCIAL

## 2023-12-13 NOTE — TELEPHONE ENCOUNTER
Galcanezumab-gnlm (EMGALITY) 120 MG/ML Solution Auto-injector      PA renewal    Initiated pa in [facility] (Key: [id number])    Prior Authorization for Emgality has been approved for a quantity of 1 , day supply 30    Prior Authorization reference number: [id number]  Insurance: [employer]  Effective dates: 12/13/2023 - 12/12/2024    Copay: $N/A RTS. Next fill 02/06/2024

## 2024-01-02 ENCOUNTER — TELEPHONE (OUTPATIENT)
Dept: CARDIOLOGY | Facility: MEDICAL CENTER | Age: 69
End: 2024-01-02
Payer: COMMERCIAL

## 2024-01-08 ENCOUNTER — TELEPHONE (OUTPATIENT)
Dept: NEUROLOGY | Facility: MEDICAL CENTER | Age: 69
End: 2024-01-08

## 2024-01-08 NOTE — TELEPHONE ENCOUNTER
Received Refill PA request via [employer]  for Emgality 120mg/ml auto injector. (Quantity:1ml, Day Supply:30)     Insurance: [employer] [employer]  Member ID:  [id number]  BIN: [id number]  PCN: [facility]  Group: [id number]     Ran Test claim via [employer] & medication Rejects stating prior authorization is required.

## 2024-01-08 NOTE — TELEPHONE ENCOUNTER
PA submitted via [facility] (Key: [id number]) awaiting response TAT 24-72 hrs. - 01/08/2024 11:50am

## 2024-01-08 NOTE — TELEPHONE ENCOUNTER
Received Refill PA request via [employer]  for Ubrelvy 100mg tab. (Quantity:16 tab, Day Supply:30)     Insurance: [employer]  Member ID:  [id number]  BIN: [id number]  PCN: [facility]  Group: [id number]     Ran Test claim via [place] & medication Rejects stating prior authorization is required.

## 2024-01-09 ENCOUNTER — APPOINTMENT (OUTPATIENT)
Dept: CARDIOLOGY | Facility: MEDICAL CENTER | Age: 69
End: 2024-01-09
Attending: FAMILY MEDICINE
Payer: COMMERCIAL

## 2024-01-09 NOTE — TELEPHONE ENCOUNTER
Prior Authorization for Ubrelvy 100mg has been approved for a quantity of 10 tabs, day supply 30    Prior Authorization reference number: [id number]  Insurance: [employer]  Effective dates: 01/08/2024 to 01/07/2025  Copay: $0.00     Is patient eligible to fill with [facility]? Yes    Next Steps: The Patients copay is less than $5.00. Will contact the patient to determine choice of pharmacy, if applicable.

## 2024-01-11 NOTE — TELEPHONE ENCOUNTER
Prior Authorization for Emgality 120mg/ml auto injector has been denied for a quantity of 1ml , day supply 30    Prior authorization was denied per the following: Member must try and fail Rai    Prior Authorization denial reference number: [id number]  Insurance: [employer] [facility]      Next Steps:Proceed with appeal process. Generate proposed appeal for provider approval & signature.

## 2024-01-19 ENCOUNTER — OFFICE VISIT (OUTPATIENT)
Dept: CARDIOLOGY | Facility: MEDICAL CENTER | Age: 69
End: 2024-01-19
Attending: STUDENT IN AN ORGANIZED HEALTH CARE EDUCATION/TRAINING PROGRAM
Payer: COMMERCIAL

## 2024-01-19 VITALS
WEIGHT: 105 LBS | RESPIRATION RATE: 16 BRPM | HEIGHT: 66 IN | SYSTOLIC BLOOD PRESSURE: 89 MMHG | BODY MASS INDEX: 16.88 KG/M2 | HEART RATE: 61 BPM | DIASTOLIC BLOOD PRESSURE: 62 MMHG | OXYGEN SATURATION: 97 %

## 2024-01-19 DIAGNOSIS — I73.00 RAYNAUD'S DISEASE WITHOUT GANGRENE: ICD-10-CM

## 2024-01-19 DIAGNOSIS — R94.31 ABNORMAL EKG: ICD-10-CM

## 2024-01-19 DIAGNOSIS — R42 LIGHTHEADEDNESS: ICD-10-CM

## 2024-01-19 PROCEDURE — 3078F DIAST BP <80 MM HG: CPT | Performed by: STUDENT IN AN ORGANIZED HEALTH CARE EDUCATION/TRAINING PROGRAM

## 2024-01-19 PROCEDURE — 3074F SYST BP LT 130 MM HG: CPT | Performed by: STUDENT IN AN ORGANIZED HEALTH CARE EDUCATION/TRAINING PROGRAM

## 2024-01-19 PROCEDURE — 99204 OFFICE O/P NEW MOD 45 MIN: CPT | Performed by: STUDENT IN AN ORGANIZED HEALTH CARE EDUCATION/TRAINING PROGRAM

## 2024-01-19 PROCEDURE — 99212 OFFICE O/P EST SF 10 MIN: CPT | Performed by: STUDENT IN AN ORGANIZED HEALTH CARE EDUCATION/TRAINING PROGRAM

## 2024-01-19 RX ORDER — AMLODIPINE BESYLATE 5 MG/1
5 TABLET ORAL DAILY
COMMUNITY
Start: 2024-01-08

## 2024-01-19 RX ORDER — DIAZEPAM 5 MG/1
5 TABLET ORAL EVERY 6 HOURS PRN
COMMUNITY

## 2024-01-19 ASSESSMENT — ENCOUNTER SYMPTOMS
NIGHT SWEATS: 0
FOCAL WEAKNESS: 0
ABDOMINAL PAIN: 0
VOMITING: 0
SYNCOPE: 0
WEAKNESS: 0
WHEEZING: 0
SHORTNESS OF BREATH: 0
PALPITATIONS: 0
ORTHOPNEA: 0
DYSPNEA ON EXERTION: 0
NAUSEA: 0
NEAR-SYNCOPE: 0
PND: 0
COUGH: 0
DIZZINESS: 0
IRREGULAR HEARTBEAT: 0
DIARRHEA: 0
FEVER: 0

## 2024-01-19 ASSESSMENT — FIBROSIS 4 INDEX: FIB4 SCORE: 1.62

## 2024-01-19 NOTE — PROGRESS NOTES
Cardiology Initial Consultation Note    Date of note:    1/19/2024    Primary Care Provider: Jenny Blake M.D.  Referring Provider: Jenny Blake M.D.    Patient Name: Rosa Walker   YOB: 1955  MRN:              [id number]    Chief Complaint: Abnormal EKG    History of Present Illness: Ms. Rosa Walker is a 68 y.o. female whose current medical problems include migraine, and depression who is here for cardiac consultation for abnormal EKG.    The patient presents today for abnormal EKG. the patient presents that when she was having an elective surgery (salpingo-oophorectomy) for cancer risk reduction, her EKG was abnormal.  As such, her primary care recommended the patient to follow-up in cardiology clinic.  The patient reports feeling well today.  She denies any chest pain or shortness of breath on exertion.  However, she reports occasional lightheadedness, which she has not had recently.  She denies any orthopnea, PND, or leg swelling.  No palpitations.  No syncope.    Cardiovascular Risk Factors:  1. Smoking status: Former smoker  2. Type II Diabetes Mellitus: None  Lab Results   Component Value Date/Time    HBA1C 5.3 10/26/2022 08:04 AM     3. Hypertension: None  4. Dyslipidemia: None   Cholesterol,Tot   Date Value Ref Range Status   10/26/2022 168 100 - 199 mg/dL Final     LDL   Date Value Ref Range Status   10/26/2022 80 <100 mg/dL Final     HDL   Date Value Ref Range Status   10/26/2022 73 >=40 mg/dL Final     Triglycerides   Date Value Ref Range Status   10/26/2022 73 0 - 149 mg/dL Final     5. Family history of early Coronary Artery Disease in a first degree relative (Male less than 55 years of age; Female less than 65 years of age): None   6.  Obesity and/or Metabolic Syndrome: Body mass index is 16.95 kg/m².  7. Sedentary lifestyle: Not sedentary    Review of Systems   Constitutional: Negative for fever, malaise/fatigue and night sweats.   Cardiovascular:  Negative for  "chest pain, dyspnea on exertion, irregular heartbeat, leg swelling, near-syncope, orthopnea, palpitations, paroxysmal nocturnal dyspnea and syncope.   Respiratory:  Negative for cough, shortness of breath and wheezing.    Gastrointestinal:  Negative for abdominal pain, diarrhea, nausea and vomiting.   Neurological:  Negative for dizziness, focal weakness and weakness.       All other systems reviewed and are negative.       Current Outpatient Medications   Medication Sig Dispense Refill    amLODIPine (NORVASC) 5 MG Tab Take 5 mg by mouth every day.      diazePAM (VALIUM) 5 MG Tab Take 5 mg by mouth every 6 hours as needed.      valacyclovir (VALTREX) 1 GM Tab Take 1 Tablet by mouth every 8 hours as needed.      Galcanezumab-gnlm (EMGALITY) 120 MG/ML Solution Auto-injector Inject 120 mg under the skin every 4 weeks. 1 mL 11    Ubrogepant (UBRELVY) 100 MG Tab Take 1/2-1 tablet  at onset of headache, may repeat dose in 2 hours if unrelieved.  Do not exceed more than 2 tablets in 24 hours. 16 Tablet 11    TRINTELLIX 5 MG Tab TAKE ONE TABLET BY MOUTH ONCE DAILY 90 Tablet 3    COMBIPATCH 0.05-0.14 MG/DAY patch two times a week.      Prasterone (INTRAROSA) 6.5 MG INSERT Insert 6.5 mg into the vagina as needed.      Vortioxetine HBr 5 MG Tab Take 1 Tablet by mouth every day. 90 Tablet 3     No current facility-administered medications for this visit.         Allergies   Allergen Reactions    Nkda [No Known Drug Allergy]            Physical Exam:  Ambulatory Vitals  BP (!) 89/62 (BP Location: Left arm, Patient Position: Sitting, BP Cuff Size: Adult)   Pulse 61   Resp 16   Ht 1.676 m (5' 6\")   Wt 47.6 kg (105 lb)   SpO2 97%    Oxygen Therapy:  Pulse Oximetry: 97 %  BP Readings from Last 4 Encounters:   01/19/24 (!) 89/62   10/03/23 106/66   09/28/23 112/57   09/13/23 94/58       Weight/BMI: Body mass index is 16.95 kg/m².  Wt Readings from Last 4 Encounters:   01/19/24 47.6 kg (105 lb)   10/03/23 46.6 kg (102 lb 11.8 oz) "   09/28/23 46.5 kg (102 lb 8.2 oz)   09/13/23 47.3 kg (104 lb 4.4 oz)         General: Well appearing and in no apparent distress  Eyes: nl conjunctiva, no icteric sclera  ENT: normal external appearance of ears, nose, and throat  Neck: no visible JVP,  no carotid bruits  Lungs: normal respiratory effort, CTAB  Heart: RRR, no murmurs, no rubs or gallops,  no edema bilateral lower extremities. No LV/RV heave on cardiac palpatation. + bilateral radial pulses.  + bilateral dp pulses.   Abdomen: soft, non tender, non distended, no masses, normal bowel sounds.  No HSM.  Extremities/MSK: no clubbing, no cyanosis  Neurological: No focal sensory deficits  Psychiatric: Appropriate affect, A/O x 3, intact judgement and insight  Skin: Warm extremities      Lab Data Review:  Lab Results   Component Value Date/Time    CHOLSTRLTOT 168 10/26/2022 08:04 AM    LDL 80 10/26/2022 08:04 AM    HDL 73 10/26/2022 08:04 AM    TRIGLYCERIDE 73 10/26/2022 08:04 AM       Lab Results   Component Value Date/Time    SODIUM 138 09/18/2023 08:59 AM    POTASSIUM 4.3 09/18/2023 08:59 AM    CHLORIDE 103 09/18/2023 08:59 AM    CO2 26 09/18/2023 08:59 AM    GLUCOSE 94 09/18/2023 08:59 AM    BUN 13 09/18/2023 08:59 AM    CREATININE 0.75 09/18/2023 08:59 AM    CREATININE 0.9 04/10/2007 08:45 AM     Lab Results   Component Value Date/Time    ALKPHOSPHAT 53 09/18/2023 08:59 AM    ASTSGOT 18 09/18/2023 08:59 AM    ALTSGPT 11 09/18/2023 08:59 AM    TBILIRUBIN 1.2 09/18/2023 08:59 AM      Lab Results   Component Value Date/Time    WBC 4.3 (L) 09/18/2023 08:59 AM    HEMOGLOBIN 14.3 09/18/2023 08:59 AM     Lab Results   Component Value Date/Time    HBA1C 5.3 10/26/2022 08:04 AM         Cardiac Imaging and Procedures Review:    EKG dated 9/28/2023: My personal interpretation is sinus rhythm, borderline right axis deviation, low voltage in precordial leads, poor R wave progression    No prior echocardiogram on file      Assessment & Plan     1. Abnormal EKG         2. Lightheadedness        3. Raynaud's disease without gangrene              Shared Medical Decision Making:    Abnormal EKG  I personally reviewed the EKG, which shows sinus rhythm with poor R wave progression.  It does not look like anterior infarct.  Patient without any symptoms of chest pain or shortness of breath on exertion.  -We discussed possibly obtaining echocardiogram to assess LVEF and any structural abnormalities.  However, given that the patient is asymptomatic, we will hold off at this time.  The patient will let me know if she decides to undergo additional testing.    Lightheadedness, resolved  Raynaud's disease  -Discussed possibly obtaining event monitor to assess for any arrhythmias.  However, patient reports that symptoms have been since resolved, and would like to hold off, which I think is reasonable.  She will let me know if she decides to undergo additional testing.  -The patient takes amlodipine for Raynaud's.  Recommended that the patient discuss with her PCP to possibly trial a different calcium channel blocker if her BP is too low on amlodipine.    All of the patient's excellent questions were answered to the best of my knowledge and to her satisfaction.  It was a pleasure seeing Ms. Rosa Walker in my clinic today. Return if symptoms worsen or fail to improve. Patient is aware to call the cardiology clinic with any questions or concerns.      Qamar Ernst MD  [facility]  [facility], [facility]  [address] [address]  Phone: [phone]  Fax: [phone]

## 2024-01-29 PROCEDURE — RXMED WILLOW AMBULATORY MEDICATION CHARGE: Performed by: PSYCHIATRY & NEUROLOGY

## 2024-01-30 ENCOUNTER — PATIENT MESSAGE (OUTPATIENT)
Dept: MEDICAL GROUP | Facility: MEDICAL CENTER | Age: 69
End: 2024-01-30
Payer: COMMERCIAL

## 2024-01-30 ENCOUNTER — TELEPHONE (OUTPATIENT)
Dept: PHARMACY | Facility: MEDICAL CENTER | Age: 69
End: 2024-01-30
Payer: COMMERCIAL

## 2024-01-30 ENCOUNTER — PHARMACY VISIT (OUTPATIENT)
Dept: PHARMACY | Facility: MEDICAL CENTER | Age: 69
End: 2024-01-30
Payer: COMMERCIAL

## 2024-01-30 DIAGNOSIS — F32.A DEPRESSION, UNSPECIFIED DEPRESSION TYPE: ICD-10-CM

## 2024-01-30 NOTE — TELEPHONE ENCOUNTER
Contact:             Phone number: [phone]     Name of person spoken with and relationship to patient: WATSON PANTOJA   Medication:   Patient’s Adherence:             How patient is doing on medication: well     How many missed doses and reason: 0     Any new medications: no     Any new conditions: no     Any new allergies: no     Any new side effects: no      Any new diagnoses: no      How many doses remainin     Did patient want to speak with pharmacist: no   Delivery:             Delivery date and method: USPS SHIP      Needs by Date:      Signature required: no     Any additional details for : FLO Mazariegos Appointment Date: NA   Shipping Address: [address] [place] [address] [place] [address]   Medication(name, strength and dose): UBRELVY 10/30DS $0   Copay: $0   Payment Method: FLO   Supplies:    Additional Information:  [facility] HAS 8 TABS ON HAND BUT WANTS TO FILL WHILE $0 COPAY. PUSHING OUT NCD A COUPLE WEEKS FOR NEXT FILL.

## 2024-02-08 RX ORDER — VORTIOXETINE 5 MG/1
1 TABLET, FILM COATED ORAL DAILY
Qty: 90 TABLET | Refills: 3 | Status: SHIPPED | OUTPATIENT
Start: 2024-02-08

## 2024-03-12 ENCOUNTER — APPOINTMENT (OUTPATIENT)
Dept: NEUROLOGY | Facility: MEDICAL CENTER | Age: 69
End: 2024-03-12
Attending: PSYCHIATRY & NEUROLOGY
Payer: COMMERCIAL

## 2024-03-15 ENCOUNTER — TELEPHONE (OUTPATIENT)
Dept: PHARMACY | Facility: MEDICAL CENTER | Age: 69
End: 2024-03-15
Payer: COMMERCIAL

## 2024-03-15 NOTE — TELEPHONE ENCOUNTER
Rosa declined a refill of her Ubrelvy at this time and still has a Emgality on hand. However she would like to refill it as soon as the new PA goes through, it makes her nervous to not have one on hand just in case. We agreed I would call back sometime next week

## 2024-03-18 ENCOUNTER — TELEPHONE (OUTPATIENT)
Dept: NEUROLOGY | Facility: MEDICAL CENTER | Age: 69
End: 2024-03-18
Payer: COMMERCIAL

## 2024-03-18 DIAGNOSIS — G43.E09 CHRONIC MIGRAINE WITH AURA WITHOUT STATUS MIGRAINOSUS, NOT INTRACTABLE: Primary | ICD-10-CM

## 2024-03-18 NOTE — TELEPHONE ENCOUNTER
Hello,    Patients Emgality is now not covered on her plan. They prefer the AJOVY and AIMOVIG. I see she tried the AIMOVIG. Will you consider changing the order to AJOVY?  Let me know.    Thanks,    Kelsey  Rx coordinator

## 2024-03-19 ENCOUNTER — TELEPHONE (OUTPATIENT)
Dept: NEUROLOGY | Facility: MEDICAL CENTER | Age: 69
End: 2024-03-19
Payer: COMMERCIAL

## 2024-03-19 RX ORDER — FREMANEZUMAB-VFRM 225 MG/1.5ML
1.5 INJECTION SUBCUTANEOUS
Qty: 1.68 ML | Refills: 11 | Status: SHIPPED | OUTPATIENT
Start: 2024-03-19 | End: 2024-04-25

## 2024-03-19 NOTE — TELEPHONE ENCOUNTER
Prior Authorization for AJOVY 225 MG/1.5ML Solution Auto-injector (Quantity: 1.5, Days: 30) has been submitted via Cover My Meds: Key ([id number])    Insurance: [facility]    Will follow up in 24-48 business hours.

## 2024-03-20 NOTE — TELEPHONE ENCOUNTER
Prior Authorization for AJOVY SOLUTION AUTO-INJECTOR 225 MG/1.5ML has been approved for a quantity of 1.5 , day supply 30    Prior Authorization reference number: [id number]  Insurance: [facility]  Effective dates: 03/19/2024 THRU 03/19/2025  Copay: $40.00     Is patient eligible to fill with [facility]? Yes    Next Steps: The patient's copay exceeds $5.00. Proceed with contacting patient to offer financial assistance.

## 2024-03-25 ENCOUNTER — DOCUMENTATION (OUTPATIENT)
Dept: PHARMACY | Facility: MEDICAL CENTER | Age: 69
End: 2024-03-25
Payer: COMMERCIAL

## 2024-03-25 PROCEDURE — RXMED WILLOW AMBULATORY MEDICATION CHARGE: Performed by: PSYCHIATRY & NEUROLOGY

## 2024-03-25 NOTE — PROGRESS NOTES
PHARMACIST PRE SCREEN - **New Onboarding**  Diagnosis: Chronic migraine with aura without status migrainosus, not intractable [G43.E09]      Drug & Non-Drug Allergies: EMR Reviewed. No concerning drug or non-drug allergies.                                                                                          Drug Therapy (name/formulation/dose/route of admin/frequency):  Ajovy 225mg/1.5mL solution in auto-injector. Inject 1 pen under the skin every 30 days.     EMR Reviewed   - Dose Appropriateness (Y/N):  Y   - Renal or Hepatic Adjustments (Y/N):  N   - Any comorbidities, PMH, precautions, or contraindications that pose a medication safety concern? (Y/N):  N   - Any relevant lab work needed that affects the initial start date? (Y/N):      N   - List Past Treatments: aimovig; Ubrelvy; sumatriptan; rizatriptan; TPI; nurtec; propranolol; topiramate    - EMR Med List reviewed.  List DDI’s:              Cat D amlodipine + ubrogepant  = can lead to increased concentrations of ubrogepant. Advised to limit dosing of ubrogepant to 50mg at headache onset and repeat dose 2 hours later if needed when used concurrently with amlodipine. Reaching out to Neurology provider regarding interaction.    - Patient’s ability to self-administer medication:  No issues identified per EMR   List Goals of Therapy:       - To reduce migraine frequency, duration, and severity    - To improve acute medication responsiveness and reduce need for acute attacks    - To identify and modify migraine triggers        Patient pre-emptively opt out of clinical services for Ajovy as of 3/25/2024       Reached out to Dr. Phelan regarding Amlodipine/Ubrogepant interaction. Was receptive to recommendation and sent a new prescription for:   Ubrelvy 50mg tab: Take 1 tab (50 mg) at the onset of aura/Headache; may re-dose x1 after 2 hrs if Headache persists; Max: 100 mg/day, do not use >2 days/week

## 2024-03-26 ENCOUNTER — PHARMACY VISIT (OUTPATIENT)
Dept: PHARMACY | Facility: MEDICAL CENTER | Age: 69
End: 2024-03-26
Payer: COMMERCIAL

## 2024-03-27 ENCOUNTER — TELEPHONE (OUTPATIENT)
Dept: NEUROLOGY | Facility: MEDICAL CENTER | Age: 69
End: 2024-03-27
Payer: COMMERCIAL

## 2024-03-27 DIAGNOSIS — G43.E09 CHRONIC MIGRAINE WITH AURA WITHOUT STATUS MIGRAINOSUS, NOT INTRACTABLE: Primary | ICD-10-CM

## 2024-03-27 RX ORDER — UBROGEPANT 50 MG/1
TABLET ORAL
Qty: 12 TABLET | Refills: 11 | Status: SHIPPED | OUTPATIENT
Start: 2024-03-27 | End: 2024-04-27

## 2024-03-27 NOTE — TELEPHONE ENCOUNTER
Received Refill PA request via [employer]  for UBRELVY 50 MG tablet. (Quantity:10, Day Supply:30)     Insurance: [facility]  Member ID:  [id number]  BIN: [id number]  PCN: [facility]  Group: [id number]     Ran Test claim via [facility] & medication Pays for a $40.00 copay. Will outreach to patient to offer specialty pharmacy services and or release to preferred pharmacy. Pa on file until 01/07/2025

## 2024-04-23 PROCEDURE — RXMED WILLOW AMBULATORY MEDICATION CHARGE: Performed by: PSYCHIATRY & NEUROLOGY

## 2024-04-25 ENCOUNTER — TELEPHONE (OUTPATIENT)
Dept: PHARMACY | Facility: MEDICAL CENTER | Age: 69
End: 2024-04-25
Payer: COMMERCIAL

## 2024-04-25 NOTE — TELEPHONE ENCOUNTER
Contact:            Phone number: [phone]     Name of person spoken with and relationship to patient: WATSON PANTOJA   Patient’s Adherence:             How patient is doing on medication: well     How many missed doses and reason: 0     Any new medications: no     Any new conditions: no     Any new allergies: no     Any new side effects: no      Any new diagnoses: no      How many doses remainin UBRELVY, 0 AJOVY     Did patient want to speak with pharmacist: no   Delivery:             Delivery date and method:       Needs by Date:     Signature required: no     Any additional details for : FLO   Teach Appointment Date: NA   Shipping Address:[address] [place] [address]   Medication(name, strength and dose): AJOVY 1.5/30DS $15, UBRELVY 10/30DS $0   Copay: $15   Payment Method: CCOF   Supplies:    Additional Information:

## 2024-04-26 ENCOUNTER — PHARMACY VISIT (OUTPATIENT)
Dept: PHARMACY | Facility: MEDICAL CENTER | Age: 69
End: 2024-04-26
Payer: COMMERCIAL

## 2024-05-24 DIAGNOSIS — G43.E09 CHRONIC MIGRAINE WITH AURA WITHOUT STATUS MIGRAINOSUS, NOT INTRACTABLE: Primary | ICD-10-CM

## 2024-05-24 RX ORDER — UBROGEPANT 100 MG/1
TABLET ORAL
Qty: 12 TABLET | Refills: 11 | Status: SHIPPED | OUTPATIENT
Start: 2024-05-24 | End: 2024-06-24

## 2024-05-24 RX ORDER — UBROGEPANT 100 MG/1
TABLET ORAL
COMMUNITY
End: 2024-05-24 | Stop reason: SDUPTHER

## 2024-05-24 NOTE — TELEPHONE ENCOUNTER
Pt has notified us that the Ubrelvy 50mg isnt working for her and that she would like go back to the 100mg. Please send a new order to the [facility] pharmacy.      Thanks,    Kelsey    Rx Coordinator

## 2024-05-29 ENCOUNTER — TELEPHONE (OUTPATIENT)
Dept: PHARMACY | Facility: MEDICAL CENTER | Age: 69
End: 2024-05-29
Payer: COMMERCIAL

## 2024-05-29 PROCEDURE — RXMED WILLOW AMBULATORY MEDICATION CHARGE: Performed by: PSYCHIATRY & NEUROLOGY

## 2024-05-29 NOTE — TELEPHONE ENCOUNTER
Contact:            Phone number: [phone]     Name of person spoken with and relationship to patient: WATSON PANTOJA   Patient’s Adherence:             How patient is doing on medication: well     How many missed doses and reason: 0     Any new medications: no     Any new conditions: no     Any new allergies: no     Any new side effects: no      Any new diagnoses: no      How many doses remaining: , 0 AJOVY     Did patient want to speak with pharmacist: no   Delivery:             Delivery date and method:  05/30/24     Needs by Date:     Signature required: no     Any additional details for : FLO Mazariegos Appointment Date: FLO   Shipping Address:[address] [place] [address]   Medication(name, strength and dose): AJOVY 1.5/30DS $15,    Copay: $15   Payment Method: CCOF   Supplies:    Additional Information:    PT WOULD LIKE US TO TRY AND FILL A 90DS ON THE AJOVY NEXT MONTH

## 2024-05-30 ENCOUNTER — PHARMACY VISIT (OUTPATIENT)
Dept: PHARMACY | Facility: MEDICAL CENTER | Age: 69
End: 2024-05-30
Payer: COMMERCIAL

## 2024-06-05 ENCOUNTER — TELEPHONE (OUTPATIENT)
Dept: NEUROLOGY | Facility: MEDICAL CENTER | Age: 69
End: 2024-06-05
Payer: COMMERCIAL

## 2024-06-05 DIAGNOSIS — G43.E09 CHRONIC MIGRAINE WITH AURA WITHOUT STATUS MIGRAINOSUS, NOT INTRACTABLE: ICD-10-CM

## 2024-06-05 NOTE — TELEPHONE ENCOUNTER
Please resend the script for Ubrelvy. The first one that was sent was printed and never reached the pharmacy.    Thanks,    Kelsey  Rx Coordinator

## 2024-06-07 DIAGNOSIS — G43.E09 CHRONIC MIGRAINE WITH AURA WITHOUT STATUS MIGRAINOSUS, NOT INTRACTABLE: ICD-10-CM

## 2024-06-07 RX ORDER — UBROGEPANT 100 MG/1
TABLET ORAL
Qty: 12 TABLET | Refills: 11 | Status: SHIPPED | OUTPATIENT
Start: 2024-06-07 | End: 2025-06-07

## 2024-06-10 PROCEDURE — RXMED WILLOW AMBULATORY MEDICATION CHARGE: Performed by: PSYCHIATRY & NEUROLOGY

## 2024-06-12 ENCOUNTER — PHARMACY VISIT (OUTPATIENT)
Dept: PHARMACY | Facility: MEDICAL CENTER | Age: 69
End: 2024-06-12
Payer: COMMERCIAL

## 2024-06-21 ENCOUNTER — TELEPHONE (OUTPATIENT)
Dept: PHARMACY | Facility: MEDICAL CENTER | Age: 69
End: 2024-06-21
Payer: COMMERCIAL

## 2024-06-21 PROCEDURE — RXMED WILLOW AMBULATORY MEDICATION CHARGE: Performed by: PSYCHIATRY & NEUROLOGY

## 2024-06-21 NOTE — TELEPHONE ENCOUNTER
Contact:            Phone number: [phone]     Name of person spoken with and relationship to patient: WATSON PANTOJA   Patient’s Adherence:             How patient is doing on medication: well     How many missed doses and reason: 0     Any new medications: no     Any new conditions: no     Any new allergies: no     Any new side effects: no      Any new diagnoses: no      How many doses remaining: , 0 AJOVY     Did patient want to speak with pharmacist: no   Delivery:             Delivery date and method:  06/24/24     Needs by Date:     Signature required: no     Any additional details for : FLO Mazariegos Appointment Date: FLO   Shipping Address:[address] [place] [address]   Medication(name, strength and dose): AJOVY 1.5/30DS $15,    Copay: $15   Payment Method: CCOF 1391   Supplies:    Additional Information:  NO QUESTIONS OR CONCERNS AT THIS TIME

## 2024-06-24 ENCOUNTER — PHARMACY VISIT (OUTPATIENT)
Dept: PHARMACY | Facility: MEDICAL CENTER | Age: 69
End: 2024-06-24
Payer: COMMERCIAL

## 2024-07-22 PROCEDURE — RXMED WILLOW AMBULATORY MEDICATION CHARGE: Performed by: PSYCHIATRY & NEUROLOGY

## 2024-07-23 ENCOUNTER — PHARMACY VISIT (OUTPATIENT)
Dept: PHARMACY | Facility: MEDICAL CENTER | Age: 69
End: 2024-07-23
Payer: COMMERCIAL

## 2024-08-12 DIAGNOSIS — F32.A DEPRESSION, UNSPECIFIED DEPRESSION TYPE: ICD-10-CM

## 2024-08-13 RX ORDER — VORTIOXETINE 5 MG/1
1 TABLET, FILM COATED ORAL
Qty: 90 TABLET | Refills: 3 | Status: SHIPPED | OUTPATIENT
Start: 2024-08-13

## 2024-08-20 PROCEDURE — RXMED WILLOW AMBULATORY MEDICATION CHARGE: Performed by: PSYCHIATRY & NEUROLOGY

## 2024-08-21 ENCOUNTER — PHARMACY VISIT (OUTPATIENT)
Dept: PHARMACY | Facility: MEDICAL CENTER | Age: 69
End: 2024-08-21
Payer: COMMERCIAL

## 2024-08-21 ENCOUNTER — HOSPITAL ENCOUNTER (OUTPATIENT)
Dept: RADIOLOGY | Facility: MEDICAL CENTER | Age: 69
End: 2024-08-21
Attending: FAMILY MEDICINE
Payer: COMMERCIAL

## 2024-08-21 DIAGNOSIS — Z12.31 SCREENING MAMMOGRAM, ENCOUNTER FOR: ICD-10-CM

## 2024-08-21 PROCEDURE — 77067 SCR MAMMO BI INCL CAD: CPT

## 2024-08-22 ENCOUNTER — TELEPHONE (OUTPATIENT)
Dept: MEDICAL GROUP | Facility: MEDICAL CENTER | Age: 69
End: 2024-08-22
Payer: COMMERCIAL

## 2024-08-22 DIAGNOSIS — R92.343 EXTREMELY DENSE TISSUE OF BOTH BREASTS ON MAMMOGRAPHY: ICD-10-CM

## 2024-08-23 NOTE — TELEPHONE ENCOUNTER
----- Message from Physician Jenny Blake M.D. sent at 8/22/2024 10:13 AM PDT -----  Since radiology called extremely dense breast tissue her insurance will likely pay for a sonocine (whole breast US) if she would like ot do that I placed the order

## 2024-08-23 NOTE — TELEPHONE ENCOUNTER
Spoke with pt and she agreed to get either a breast US or MRI.  Dr. Blake would you please order or advise..

## 2024-08-27 ENCOUNTER — TELEPHONE (OUTPATIENT)
Dept: MEDICAL GROUP | Facility: MEDICAL CENTER | Age: 69
End: 2024-08-27
Payer: COMMERCIAL

## 2024-08-27 ENCOUNTER — HOSPITAL ENCOUNTER (OUTPATIENT)
Dept: RADIOLOGY | Facility: MEDICAL CENTER | Age: 69
End: 2024-08-27
Attending: FAMILY MEDICINE
Payer: COMMERCIAL

## 2024-08-27 DIAGNOSIS — R92.30 DENSE BREAST: ICD-10-CM

## 2024-08-27 PROCEDURE — 76641 ULTRASOUND BREAST COMPLETE: CPT

## 2024-09-13 ENCOUNTER — PHARMACY VISIT (OUTPATIENT)
Dept: PHARMACY | Facility: MEDICAL CENTER | Age: 69
End: 2024-09-13
Payer: COMMERCIAL

## 2024-09-13 PROCEDURE — RXMED WILLOW AMBULATORY MEDICATION CHARGE: Performed by: PSYCHIATRY & NEUROLOGY

## 2024-10-24 PROCEDURE — RXMED WILLOW AMBULATORY MEDICATION CHARGE: Performed by: PSYCHIATRY & NEUROLOGY

## 2024-10-28 ENCOUNTER — PHARMACY VISIT (OUTPATIENT)
Dept: PHARMACY | Facility: MEDICAL CENTER | Age: 69
End: 2024-10-28
Payer: COMMERCIAL

## 2024-11-20 PROCEDURE — RXMED WILLOW AMBULATORY MEDICATION CHARGE: Performed by: PSYCHIATRY & NEUROLOGY

## 2024-11-21 ENCOUNTER — PHARMACY VISIT (OUTPATIENT)
Dept: PHARMACY | Facility: MEDICAL CENTER | Age: 69
End: 2024-11-21
Payer: COMMERCIAL

## 2024-12-18 PROCEDURE — RXMED WILLOW AMBULATORY MEDICATION CHARGE: Performed by: PSYCHIATRY & NEUROLOGY

## 2024-12-19 ENCOUNTER — PHARMACY VISIT (OUTPATIENT)
Dept: PHARMACY | Facility: MEDICAL CENTER | Age: 69
End: 2024-12-19
Payer: COMMERCIAL

## 2025-01-04 ENCOUNTER — PHARMACY VISIT (OUTPATIENT)
Dept: PHARMACY | Facility: MEDICAL CENTER | Age: 70
End: 2025-01-04
Payer: COMMERCIAL

## 2025-01-04 PROCEDURE — RXMED WILLOW AMBULATORY MEDICATION CHARGE: Performed by: SURGERY

## 2025-01-04 RX ORDER — CLOTRIMAZOLE 10 MG/1
10 LOZENGE ORAL
Qty: 35 TROCHE | Refills: 1 | OUTPATIENT
Start: 2025-01-04 | End: 2025-01-11

## 2025-02-02 PROCEDURE — RXMED WILLOW AMBULATORY MEDICATION CHARGE: Performed by: PSYCHIATRY & NEUROLOGY

## 2025-02-04 ENCOUNTER — PHARMACY VISIT (OUTPATIENT)
Dept: PHARMACY | Facility: MEDICAL CENTER | Age: 70
End: 2025-02-04
Payer: COMMERCIAL

## 2025-02-05 ENCOUNTER — TELEPHONE (OUTPATIENT)
Dept: MEDICAL GROUP | Facility: MEDICAL CENTER | Age: 70
End: 2025-02-05

## 2025-02-05 NOTE — TELEPHONE ENCOUNTER
Caller Name: Rosa Hieu Lydon    Call Back Number: [phone] (home)       How would the patient prefer to be contacted with a response: Phone call OK to leave a detailed message    Patient left message regarding cardiologist referral patient also has new group ID number for insurance.

## 2025-02-06 NOTE — TELEPHONE ENCOUNTER
Pt informed she needs an tyrell. I scheduled pt as follow:  Future Appointments         Provider Department Center    2/10/2025 9:20 AM (Arrive by 9:05 AM) Jenny Blake M.D. [facility]

## 2025-02-07 ENCOUNTER — TELEPHONE (OUTPATIENT)
Dept: PHARMACY | Facility: MEDICAL CENTER | Age: 70
End: 2025-02-07

## 2025-02-07 NOTE — TELEPHONE ENCOUNTER
Prior Authorization for Ubrogepant (UBRELVY) 100 MG Tab  (Quantity: 12, Days: 30) has been submitted via Cover My Meds: Key ([id number])    Insurance: [id number]    Will follow up in 24-48 business hours.

## 2025-02-07 NOTE — TELEPHONE ENCOUNTER
Received Renewal PA request via MSOT  for Ubrogepant (UBRELVY) 100 MG Tab . (Quantity:12, Day Supply:30)     Insurance: [facility]  Member ID:  [id number]  BIN: [id number]  PCN: [facility]  Group: [id number]     Ran Test claim via [facility] & medication Rejects stating prior authorization is required.

## 2025-02-10 ENCOUNTER — OFFICE VISIT (OUTPATIENT)
Dept: MEDICAL GROUP | Facility: MEDICAL CENTER | Age: 70
End: 2025-02-10
Payer: COMMERCIAL

## 2025-02-10 VITALS
OXYGEN SATURATION: 97 % | DIASTOLIC BLOOD PRESSURE: 60 MMHG | TEMPERATURE: 98.2 F | BODY MASS INDEX: 16.71 KG/M2 | HEART RATE: 61 BPM | WEIGHT: 104 LBS | HEIGHT: 66 IN | RESPIRATION RATE: 20 BRPM | SYSTOLIC BLOOD PRESSURE: 100 MMHG

## 2025-02-10 DIAGNOSIS — R42 DIZZINESS: ICD-10-CM

## 2025-02-10 DIAGNOSIS — B00.9 HSV INFECTION: ICD-10-CM

## 2025-02-10 DIAGNOSIS — R55 NEAR SYNCOPE: ICD-10-CM

## 2025-02-10 DIAGNOSIS — F32.A DEPRESSION, UNSPECIFIED DEPRESSION TYPE: ICD-10-CM

## 2025-02-10 DIAGNOSIS — Z23 NEED FOR VACCINATION: ICD-10-CM

## 2025-02-10 PROCEDURE — 90715 TDAP VACCINE 7 YRS/> IM: CPT | Performed by: FAMILY MEDICINE

## 2025-02-10 PROCEDURE — 3074F SYST BP LT 130 MM HG: CPT | Performed by: FAMILY MEDICINE

## 2025-02-10 PROCEDURE — 3078F DIAST BP <80 MM HG: CPT | Performed by: FAMILY MEDICINE

## 2025-02-10 PROCEDURE — 90471 IMMUNIZATION ADMIN: CPT | Performed by: FAMILY MEDICINE

## 2025-02-10 PROCEDURE — 99214 OFFICE O/P EST MOD 30 MIN: CPT | Mod: 25 | Performed by: FAMILY MEDICINE

## 2025-02-10 RX ORDER — VALACYCLOVIR HYDROCHLORIDE 500 MG/1
500 TABLET, FILM COATED ORAL 2 TIMES DAILY
Qty: 90 TABLET | Refills: 3 | Status: SHIPPED | OUTPATIENT
Start: 2025-02-10 | End: 2025-03-27

## 2025-02-10 RX ORDER — VALACYCLOVIR HYDROCHLORIDE 1 G/1
1000 TABLET, FILM COATED ORAL EVERY 8 HOURS PRN
Qty: 20 TABLET | Status: CANCELLED | OUTPATIENT
Start: 2025-02-10

## 2025-02-10 RX ORDER — VORTIOXETINE 5 MG/1
1 TABLET, FILM COATED ORAL
Qty: 90 TABLET | Refills: 3 | Status: SHIPPED | OUTPATIENT
Start: 2025-02-10

## 2025-02-10 ASSESSMENT — FIBROSIS 4 INDEX: FIB4 SCORE: 1.64

## 2025-02-10 ASSESSMENT — PATIENT HEALTH QUESTIONNAIRE - PHQ9: CLINICAL INTERPRETATION OF PHQ2 SCORE: 0

## 2025-02-10 NOTE — ASSESSMENT & PLAN NOTE
A lot of stressors  One of her sons having health issues   Refill trintillex but would need prior auth     30+ min spent

## 2025-02-10 NOTE — ASSESSMENT & PLAN NOTE
She has noticed lately orthostatic dizziness symptoms which seem to have gotten worse lately   We will do echo  She experiences no palpitations     Option to consult with cardiology as well   She has seen cardiology in the past       30+ min spent

## 2025-02-10 NOTE — PROGRESS NOTES
This medical record contains text that has been entered with the assistance of computer voice recognition and dictation software.  Therefore, it may contain unintended errors in text, spelling, punctuation, or grammar        Chief Complaint   Patient presents with    Lightheadedness     Almost fainting spells on and off x 2 years. It happens 4-6 times a mo since 2 years.  Pt used to see the Cardiologist uncertain why but stop seen the cardiologist 2 years ago.        Rosa Walker is a 69 y.o. female here evaluation and management of:   See above     Current Outpatient Medications   Medication Sig Dispense Refill    valACYclovir (VALTREX) 500 MG Tab Take 1 Tablet by mouth 2 times a day for 45 days. 90 Tablet 3    Vortioxetine HBr (TRINTELLIX) 5 MG Tab Take 1 Tablet by mouth every day. 90 Tablet 3    Ubrogepant (UBRELVY) 100 MG Tab Take 1 tablet at onset of headache, may repeat dose in 2 hours if unrelieved. Do not exceed more than 2 tablets in 24 hours.  Indications: Migraine Headache 12 Tablet 11    Fremanezumab-vfrm (AJOVY) 225 MG/1.5ML Solution Auto-injector Inject 1.5 mL under the skin every 30 (thirty) days for 1 dose. 1.68 mL 11    diazePAM (VALIUM) 5 MG Tab Take 5 mg by mouth every 6 hours as needed.      valacyclovir (VALTREX) 1 GM Tab Take 1 Tablet by mouth every 8 hours as needed.      COMBIPATCH 0.05-0.14 MG/DAY patch two times a week.      Prasterone (INTRAROSA) 6.5 MG INSERT Insert 6.5 mg into the vagina as needed.      amLODIPine (NORVASC) 5 MG Tab Take 5 mg by mouth every day.      Vortioxetine HBr 5 MG Tab Take 1 Tablet by mouth every day. 90 Tablet 3     No current facility-administered medications for this visit.     Patient Active Problem List    Diagnosis Date Noted    Near syncope 02/10/2025    HSV infection 02/10/2025    Abnormal EKG 10/03/2023    Vision problem 03/14/2023    Right knee pain 02/24/2023    Anxiety 02/24/2023    Preventative health care 05/06/2022    Cervicalgia  06/15/2021    Cervical myofascial pain syndrome 06/15/2021    Bilateral occipital neuralgia 06/15/2021    Chronic migraine with aura 2020    Ocular migraine 2020    Migraine 2020    Atrophic vaginitis 2020    Open-angle glaucoma 2018    Osteopenia of necks of both femurs 2018    Annual physical exam 2018    Neck pain 10/02/2014    Family history of ovarian cancer 10/02/2014    Depression 2013    Hot flashes 2013     Past Surgical History:   Procedure Laterality Date    GA LAP,DIAGNOSTIC ABDOMEN Bilateral 2023    Procedure: LAPAROSCOPICALLY ASSISTED BILATERAL SALPINGO OOPHORECTOMY;  Surgeon: Margi Olivarez M.D.;  Location: SURGERY SAME DAY [facility];  Service: Gynecology    CATARACT PHACO WITH IOL  2012    Performed by GENO ARREDONDO at SURGERY [facility] ORS    CATARACT PHACO WITH IOL  2012    Performed by GENO ARREDONDO at [facility] ORS    KNEE ARTHROSCOPY  2010    Performed by FLORI CHILDRESS at SURGERY [facility] ORS    MENISCECTOMY  2010    Performed by FLORI CHILDRESS at SURGERY [facility] ORS    OTHER SURGICAL PROCEDURE  2005    Full thickness skin graft left politeal fossa    RETINAL DETACHMENT REPAIR  1992    left     EYE SURGERY      SCLERAL BUCKLING Left       Social History     Tobacco Use    Smoking status: Former     Current packs/day: 0.00     Average packs/day: 0.5 packs/day for 10.0 years (5.0 ttl pk-yrs)     Types: Cigarettes     Start date: 1975     Quit date: 1985     Years since quittin.1     Passive exposure: Never    Smokeless tobacco: Never   Vaping Use    Vaping status: Never Used   Substance Use Topics    Alcohol use: Yes     Alcohol/week: 3.0 oz     Types: 5 Glasses of wine per week     Comment: 5 glass wine per week    Drug use: No     Family History   Problem Relation Age of Onset    Cancer Mother 74        ovarian    Other Father         sepsis  "          ROS    all review of system completed and negative except for those listed above     Objective:     /60 (BP Location: Left arm, Patient Position: Sitting, BP Cuff Size: Adult)   Pulse 61   Temp 36.8 °C (98.2 °F) (Temporal)   Resp 20   Ht 1.676 m (5' 6\")   Wt 47.2 kg (104 lb)   SpO2 97%  Body mass index is 16.79 kg/m².  Physical Exam:    Constitutional: Alert, no distress.  Skin: Warm, dry, good turgor, no rashes in visible areas.  Eye: Equal, round and reactive, conjunctiva clear, lids normal.  ENMT: Lips without lesions, good dentition, oropharynx clear.  Neck: Trachea midline, no masses, no thyromegaly. No cervical or supraclavicular lymphadenopathy.  Respiratory: Unlabored respiratory effort, lungs clear to auscultation, no wheezes, no ronchi.  Cardiovascular: Normal S1, S2, no murmur, no edema.  Abdomen: Soft, non-tender, no masses, no hepatosplenomegaly.  Psych: Alert and oriented x3, normal affect and mood.          Assessment and Plan:   The following treatment plan was discussed        Problem List Items Addressed This Visit       Depression     A lot of stressors  One of her sons having health issues   Refill trintillex but would need prior auth     30+ min spent          Relevant Medications    Vortioxetine HBr (TRINTELLIX) 5 MG Tab    Near syncope     She has noticed lately orthostatic dizziness symptoms which seem to have gotten worse lately   We will do echo  She experiences no palpitations     Option to consult with cardiology as well   She has seen cardiology in the past       30+ min spent          Relevant Orders    EC-ECHOCARDIOGRAM COMPLETE W/O CONT    HSV infection     Request refill valtrex           Relevant Medications    valACYclovir (VALTREX) 500 MG Tab     Other Visit Diagnoses       Need for vaccination        Relevant Orders    Tdap Vaccine =>6YO IM (Completed)    Dizziness        Relevant Orders    EC-ECHOCARDIOGRAM COMPLETE W/O CONT                  Instructed to " follow up if symptoms worsen or fail to improve, ER/UC precautions discussed as well    Jenny Blake MD  [facility], [address]   [place] [address]  Phone: [phone]

## 2025-02-19 ENCOUNTER — TELEPHONE (OUTPATIENT)
Dept: NEUROLOGY | Facility: MEDICAL CENTER | Age: 70
End: 2025-02-19
Payer: COMMERCIAL

## 2025-02-20 NOTE — TELEPHONE ENCOUNTER
Prior Authorization for     Ubrogepant (UBRELVY) 100 MG Tab    has been denied for a quantity of 12 , day supply 30    Prior authorization was denied per the following:     Prior Authorization denial reference number: [id number]  Insurance: [facility]      Next Steps:Proceed with appeal process. Generate proposed appeal for provider approval & signature.

## 2025-02-21 PROCEDURE — RXMED WILLOW AMBULATORY MEDICATION CHARGE: Performed by: PSYCHIATRY & NEUROLOGY

## 2025-02-25 ENCOUNTER — PHARMACY VISIT (OUTPATIENT)
Dept: PHARMACY | Facility: MEDICAL CENTER | Age: 70
End: 2025-02-25
Payer: COMMERCIAL

## 2025-02-25 PROCEDURE — RXMED WILLOW AMBULATORY MEDICATION CHARGE: Performed by: PSYCHIATRY & NEUROLOGY

## 2025-03-04 PROCEDURE — RXMED WILLOW AMBULATORY MEDICATION CHARGE: Performed by: OBSTETRICS & GYNECOLOGY

## 2025-03-06 ENCOUNTER — PHARMACY VISIT (OUTPATIENT)
Dept: PHARMACY | Facility: MEDICAL CENTER | Age: 70
End: 2025-03-06
Payer: COMMERCIAL

## 2025-03-06 RX ORDER — ESTRADIOL/NORETHINDRONE ACETATE TRANSDERMAL SYSTEM .05; .14 MG/D; MG/D
1 PATCH, EXTENDED RELEASE TRANSDERMAL
Qty: 12 PATCH | Refills: 1 | Status: CANCELLED | OUTPATIENT
Start: 2025-03-06

## 2025-03-17 ENCOUNTER — OFFICE VISIT (OUTPATIENT)
Dept: NEUROLOGY | Facility: MEDICAL CENTER | Age: 70
End: 2025-03-17
Attending: PSYCHIATRY & NEUROLOGY
Payer: COMMERCIAL

## 2025-03-17 VITALS
SYSTOLIC BLOOD PRESSURE: 112 MMHG | WEIGHT: 105.38 LBS | TEMPERATURE: 96.8 F | BODY MASS INDEX: 16.94 KG/M2 | OXYGEN SATURATION: 98 % | DIASTOLIC BLOOD PRESSURE: 64 MMHG | HEART RATE: 62 BPM | RESPIRATION RATE: 14 BRPM | HEIGHT: 66 IN

## 2025-03-17 DIAGNOSIS — G43.E09 CHRONIC MIGRAINE WITH AURA WITHOUT STATUS MIGRAINOSUS, NOT INTRACTABLE: ICD-10-CM

## 2025-03-17 PROCEDURE — 3078F DIAST BP <80 MM HG: CPT | Performed by: PSYCHIATRY & NEUROLOGY

## 2025-03-17 PROCEDURE — 99213 OFFICE O/P EST LOW 20 MIN: CPT | Performed by: PSYCHIATRY & NEUROLOGY

## 2025-03-17 PROCEDURE — 99212 OFFICE O/P EST SF 10 MIN: CPT | Performed by: PSYCHIATRY & NEUROLOGY

## 2025-03-17 PROCEDURE — 3074F SYST BP LT 130 MM HG: CPT | Performed by: PSYCHIATRY & NEUROLOGY

## 2025-03-17 RX ORDER — FREMANEZUMAB-VFRM 225 MG/1.5ML
1.5 INJECTION SUBCUTANEOUS
Qty: 1.5 ML | Refills: 11 | Status: SHIPPED | OUTPATIENT
Start: 2025-03-17 | End: 2026-03-17

## 2025-03-17 RX ORDER — UBROGEPANT 100 MG/1
TABLET ORAL
Qty: 12 TABLET | Refills: 11 | Status: SHIPPED | OUTPATIENT
Start: 2025-03-17 | End: 2026-03-17

## 2025-03-17 ASSESSMENT — FIBROSIS 4 INDEX: FIB4 SCORE: 1.64

## 2025-03-17 ASSESSMENT — PATIENT HEALTH QUESTIONNAIRE - PHQ9: CLINICAL INTERPRETATION OF PHQ2 SCORE: 0

## 2025-03-17 NOTE — PROGRESS NOTES
"[facility] NEUROLOGY  GENERAL NEUROLOGY  FOLLOW-UP VISIT    CC: chronic migraine w/ aura    INTERVAL HISTORY:  Rosa Walker is a 69 y.o. woman with chronic migraine with aura and a history otherwise notable for occipital neuralgia, cervical myofascial pain syndrome, open-angle glaucoma, and anxiety.  I last saw her in the clinic on 9/13/2023.  At that time I recommended she continue Emgality and try Ubrelvy.  Today, she was unaccompanied, and she provided the following interval history:    The following is a summary of headache symptoms, presented in my standard format:     Family History: father, brothers (x2), children (4 out of 5)  Age at onset (years): 12-14  Location: posterior cervical  Radiation:   Frequency: baseline: 15+/30, lately: 6/30 (most are visual marco alone)  Duration: baseline: unknown, she always takes something, lately: 30 minutes  Headache Days/Month: see above  Quality: \"throbbing\"  Intensity: 5/10  Aura: visual (\"flashes, spotty vision\")  Photophobia/Phonophobia/Nausea/Vomiting: yes/yes/yes/no  Provoked by Physical Activity?:   Triggers: hunger, changes in barometric pressure  Associated Symptoms: diplopia (most recent on 12/25/2024)  Autonomic Signs (such as ptosis, miosis, conjunctival injection, rhinorrhea, increased lacrimation): none  Head Trauma: 2022: struck head on ground, no LOC  Association with Menses: n/a  ED Visits:   Hospitalizations:   Missed Work Days (NP): retired  Sleep (hours/night): 7-8 hours/night, feels well-rested  Caffeine Intake: 2 cups/day  Hydration: keeps well-hydrated  Nutrition: eats regularly  Exercise:   Analgesic Overuse:      Current Medication Regimen:  - Ajovy: helpful  - Ubrelvy: helpful, takes this 5-7 times/month, starts working in ~10 minutes    Medications Tried: Response  Preventive:  - propranolol: ineffective, associated with intolerable fatigue  - topiramate: made her feel \"really spacy\"  - Aimovig 70: associated with constipation  - Emgality: " helpful     Rescue:  - sumatriptan: 100 mg ineffective  - Nurtec: worked well, insurance wouldn't cover this     Medications Not Tried:  -      MEDICATIONS:  Current Outpatient Medications   Medication Sig    Fremanezumab-vfrm (AJOVY) 225 MG/1.5ML Solution Auto-injector Inject 1.5 mL under the skin every 30 (thirty) days.    Ubrogepant (UBRELVY) 100 MG Tab Take 1 tablet at onset of headache, may repeat dose in 2 hours if unrelieved. Do not exceed more than 2 tablets in 24 hours.  Indications: Migraine Headache    estradiol-norethindrone (COMBIPATCH) 0.05-0.14 MG/DAY patch Place 1 Patch on the skin two times a week.    valACYclovir (VALTREX) 500 MG Tab Take 1 Tablet by mouth 2 times a day for 45 days.    Vortioxetine HBr (TRINTELLIX) 5 MG Tab Take 1 Tablet by mouth every day.    diazePAM (VALIUM) 5 MG Tab Take 5 mg by mouth every 6 hours as needed.    valacyclovir (VALTREX) 1 GM Tab Take 1 Tablet by mouth every 8 hours as needed.    COMBIPATCH 0.05-0.14 MG/DAY patch two times a week.    Prasterone (INTRAROSA) 6.5 MG INSERT Insert 6.5 mg into the vagina as needed.     MEDICAL, SOCIAL, AND FAMILY HISTORY:  There is no change in the patient's ROS or medical, social, or family histories since the previous visit on 9/13/2023.    REVIEW OF SYSTEMS:  A ROS was completed.  Pertinent positives and negatives were included in the HPI, above.  All other systems were reviewed and are negative.    PHYSICAL EXAM:  General/Medical:  - NAD    Neuro:  MENTAL STATUS: awake and alert; no deficits of speech or language; oriented to conversation; affect was appropriate to situation; pleasant, cooperative    CRANIAL NERVES:    II: acuity: NT, fields: NT, pupils: NT, discs: NT    III/IV/VI: versions: grossly intact    V: facial sensation: NT    VII: facial expression: symmetric    VIII: hearing: intact to voice    IX/X: palate: NT    XI: shoulder shrug: NT    XII: tongue: NT    MOTOR:  - bulk: NT  - tone: NT  Upper Extremity Strength (R/L)     NT   Elbow flexion NT   Elbow extension NT   Shoulder abduction NT     Lower Extremity Strength  (R/L)   Hip flexion NT   Knee extension NT   Knee flexion NT   Ankle dorsiflexion NT   Ankle plantarflexion NT     - heel-walk: NT  - toe-walk: NT  - pronator drift: absent  - abnormal movements: none    SENSATION:  - light touch: NT  - vibration (R/L, seconds): NT at the great toes  - pinprick: NT  - proprioception: NT  - Romberg: NT    COORDINATION:  - finger to nose: NT  - finger tapping: NT    REFLEXES:  Reflex Right Left   BR NT NT   Biceps NT NT   Triceps NT NT   Patellae NT NT   Achilles NT NT   Toes NT NT     GAIT:  - NT    REVIEW OF IMAGING STUDIES:  No additional data since the last visit.    REVIEW OF LABORATORY STUDIES:  No additional data since the last visit.    ASSESSMENT:  Rosa Walker is a 69 y.o. woman with chronic migraine with aura and a history otherwise notable for occipital neuralgia, cervical myofascial pain syndrome, open-angle glaucoma, and anxiety.  Plans/recommendations as follows:    PLAN:  Chronic Migraine w/ Aura:  Prevention:  - continue Ajovy  - get 7-9 hours of sleep per night; can try supplementing melatonin 2-10 mg, 2-3 hours before bedtime  - drink plenty of fluids (urine should be nearly clear)  - avoid excessive caffeine intake (no more than 2 servings per day and nothing in the afternoon)  - eat regular meals (don't skip meals)  - get moderate exercise (even just a 20 minute walk daily)    Rescue:  - continue Ubrelvy 100 mg, take 100 mg at the onset of aura/HA; may re-dose x1 after 2 hrs if HA persists; MDD: 200 mg  - do not use analgesics (e.g., ibuprofen, acetaminophen) more than 2 days per week in order to avoid analgesic rebound headaches    - keep a headache log    Follow-Up:  - Return in about 1 year (around 3/17/2026).    Signed: Jerrod Phelan M.D.

## 2025-03-20 ENCOUNTER — TELEPHONE (OUTPATIENT)
Dept: PHARMACY | Facility: MEDICAL CENTER | Age: 70
End: 2025-03-20
Payer: COMMERCIAL

## 2025-03-20 NOTE — TELEPHONE ENCOUNTER
Prior Authorization for  (AJOVY) 225 MG/1.5ML Solution Auto-injector (Quantity: 1.5, Days: 30) has been submitted via Cover My Meds: Key ([id number])    Insurance: [employer] 2    Will follow up in 24-48 business hours.

## 2025-03-20 NOTE — TELEPHONE ENCOUNTER
Received Renewal PA request via [employer]  for  (AJOVY) 225 MG/1.5ML Solution Auto-injector. (Quantity:1.5, Day Supply:30)     Insurance: [employer] 2  Member ID:  [id number]  BIN: [id number]  PCN: [facility]  Group: [id number]     Ran Test claim via [place] & medication Rejects stating prior authorization is required.

## 2025-03-21 PROCEDURE — RXMED WILLOW AMBULATORY MEDICATION CHARGE: Performed by: PSYCHIATRY & NEUROLOGY

## 2025-03-21 NOTE — TELEPHONE ENCOUNTER
Prior Authorization for (AJOVY) 225 MG/1.5ML Solution Auto-injector  has been approved for a quantity of 1.5 , day supply 30    Prior Authorization reference number: [id number]  Insurance: [employer] 2  Effective dates: 03/21/25 to 3/21/26  Copay: $25     Is patient eligible to fill with [facility]? Yes    Next Steps: The patient's copay exceeds $5.00. Proceed with contacting patient to offer financial assistance.

## 2025-03-24 ENCOUNTER — PHARMACY VISIT (OUTPATIENT)
Dept: PHARMACY | Facility: MEDICAL CENTER | Age: 70
End: 2025-03-24
Payer: COMMERCIAL

## 2025-04-02 ENCOUNTER — TELEPHONE (OUTPATIENT)
Dept: SURGERY | Facility: MEDICAL CENTER | Age: 70
End: 2025-04-02
Payer: COMMERCIAL

## 2025-04-02 NOTE — TELEPHONE ENCOUNTER
Requested records from Dermatology Office - Sheila Flores  [phone] - Had to leave a message. Need for appointment on 4/15- Humberto.

## 2025-04-03 NOTE — PROGRESS NOTES
Subjective:   4/15/2025 10:21 AM  Primary care physician: Jenny Blake M.D.  Referring Provider: Nazia CONNOLLY ([facility])    Chief Complaint:   Chief Complaint   Patient presents with    Procedure     BCC L, R TRAPEZIUS NECK  SHAVE BX 3/3  PROCEDURE IN-OFFICE        Diagnosis:   1. Basal cell carcinoma (BCC) of skin of neck  Dermabond    lidocaine-epinephrine 1% 1:127271 injection 10 mL    Referral to Oncology Psychosocial Screening for Distress    Consent for all Surgical, Special Diagnostic or Therapeutic Procedures    Consent for all Surgical, Special Diagnostic or Therapeutic Procedures    Pathology Specimen    Pathology Specimen    Pathology Specimen    Pathology Specimen          History of presenting illness:    Rosa Walker is a pleasant 69 y.o. female who is referred by her dermatologist for evaluation of 2 skin cancers needing treatment.    Shave biopsy of left and right trapezial neck on 3/3/25 noted basal cell carcinoma, nodular type.    She is here today for surgical excision.  These lesions were identified by her dermatologist at the time of a total body skin exam.  She has had basal cell carcinoma in the past which have been resected.  No significant cancer history other than the basal cell carcinomas     Past Medical History:   Diagnosis Date    Arthritis     Cataract     IOL OU    Depression 07/16/2013    Glaucoma     H/O OU, Treated With Laser    Hot flashes 07/16/2013    Psychiatric problem     depression     Past Surgical History:   Procedure Laterality Date    MT LAP,DIAGNOSTIC ABDOMEN Bilateral 9/28/2023    Procedure: LAPAROSCOPICALLY ASSISTED BILATERAL SALPINGO OOPHORECTOMY;  Surgeon: Margi Oilvarez M.D.;  Location: SURGERY SAME DAY [facility];  Service: Gynecology    CATARACT PHACO WITH IOL  05/07/2012    Performed by GENO ARREDONDO at SURGERY SURGICAL [facility] ORS    CATARACT PHACO WITH IOL  04/16/2012    Performed by MARIA ELENA  GENO LAWRENCE at SURGERY SURGICAL [facility]S    KNEE ARTHROSCOPY  2010    Performed by FLORI CHILDRESS at SURGERY [facility] ORS    MENISCECTOMY  2010    Performed by FLORI CHILDRESS at SURGERY [facility] ORS    OTHER SURGICAL PROCEDURE  2005    Full thickness skin graft left politeal fossa    RETINAL DETACHMENT REPAIR  1992    left     EYE SURGERY      SCLERAL BUCKLING Left      Allergies   Allergen Reactions    Nkda [No Known Drug Allergy]      Outpatient Encounter Medications as of 4/15/2025   Medication Sig Dispense Refill    Fremanezumab-vfrm (AJOVY) 225 MG/1.5ML Solution Auto-injector Inject 1.5 mL under the skin every 30 (thirty) days. 1.5 mL 11    Ubrogepant (UBRELVY) 100 MG Tab Take 1 tablet at onset of headache, may repeat dose in 2 hours if unrelieved. Do not exceed more than 2 tablets in 24 hours.  Indications: Migraine Headache 12 Tablet 11    estradiol-norethindrone (COMBIPATCH) 0.05-0.14 MG/DAY patch Place 1 Patch on the skin two times a week. 12 Patch 1    Vortioxetine HBr (TRINTELLIX) 5 MG Tab Take 1 Tablet by mouth every day. 90 Tablet 3    valacyclovir (VALTREX) 1 GM Tab Take 1 Tablet by mouth every 8 hours as needed.      COMBIPATCH 0.05-0.14 MG/DAY patch two times a week.      Prasterone (INTRAROSA) 6.5 MG INSERT Insert 6.5 mg into the vagina as needed.      diazePAM (VALIUM) 5 MG Tab Take 5 mg by mouth every 6 hours as needed.      [] lidocaine-epinephrine 1% 1:053334 injection 10 mL        No facility-administered encounter medications on file as of 4/15/2025.     Social History     Socioeconomic History    Marital status:      Spouse name: Not on file    Number of children: Not on file    Years of education: Not on file    Highest education level: Not on file   Occupational History    Not on file   Tobacco Use    Smoking status: Former     Current packs/day: 0.00     Average packs/day: 0.5 packs/day for 10.0 years (5.0 ttl pk-yrs)     Types: Cigarettes      Start date: 1975     Quit date: 1985     Years since quittin.3     Passive exposure: Never    Smokeless tobacco: Never   Vaping Use    Vaping status: Never Used   Substance and Sexual Activity    Alcohol use: Not Currently     Alcohol/week: 3.0 oz     Types: 5 Glasses of wine per week     Comment: 5 glass wine per week    Drug use: No    Sexual activity: Yes     Partners: Male   Other Topics Concern    Not on file   Social History Narrative    Not on file     Social Drivers of Health     Financial Resource Strain: Not on file   Food Insecurity: Not on file   Transportation Needs: Not on file   Physical Activity: Not on file   Stress: Not on file   Social Connections: Not on file   Intimate Partner Violence: Not on file   Housing Stability: Not on file      Social History     Tobacco Use   Smoking Status Former    Current packs/day: 0.00    Average packs/day: 0.5 packs/day for 10.0 years (5.0 ttl pk-yrs)    Types: Cigarettes    Start date: 1975    Quit date: 1985    Years since quittin.3    Passive exposure: Never   Smokeless Tobacco Never     Social History     Substance and Sexual Activity   Alcohol Use Not Currently    Alcohol/week: 3.0 oz    Types: 5 Glasses of wine per week    Comment: 5 glass wine per week     Social History     Substance and Sexual Activity   Drug Use No      Family History   Problem Relation Age of Onset    Cancer Mother 74        ovarian    Other Father         sepsis       Review of Systems   Constitutional:  Negative for chills, fever, malaise/fatigue and weight loss.   HENT:  Negative for congestion, ear discharge, ear pain, hearing loss and nosebleeds.    Eyes:  Negative for blurred vision, double vision, photophobia, pain and discharge.   Respiratory:  Negative for cough, hemoptysis and sputum production.    Cardiovascular:  Negative for chest pain, palpitations, orthopnea and claudication.   Gastrointestinal:  Negative for abdominal pain, constipation,  "diarrhea, heartburn, nausea and vomiting.   Genitourinary:  Negative for dysuria, frequency, hematuria and urgency.   Musculoskeletal:  Negative for back pain, joint pain, myalgias and neck pain.   Skin:  Negative for itching and rash.   Neurological:  Negative for dizziness, tingling, tremors, sensory change, speech change, focal weakness and headaches.   Endo/Heme/Allergies:  Negative for environmental allergies. Does not bruise/bleed easily.   Psychiatric/Behavioral:  Negative for depression, hallucinations, substance abuse and suicidal ideas. The patient is not nervous/anxious.         Objective:   BP (!) 88/58 (BP Location: Left arm, Patient Position: Sitting, BP Cuff Size: Adult)   Pulse 70   Temp 36.7 °C (98.1 °F) (Temporal)   Ht 1.676 m (5' 6\")   Wt 47 kg (103 lb 9.9 oz)   SpO2 96%   BMI 16.72 kg/m²     Physical Exam  Constitutional:       General: She is not in acute distress.  HENT:      Head: Normocephalic and atraumatic.   Eyes:      General: No scleral icterus.  Cardiovascular:      Rate and Rhythm: Normal rate and regular rhythm.   Pulmonary:      Effort: No respiratory distress.   Abdominal:      Palpations: Abdomen is soft.   Musculoskeletal:      Cervical back: Normal range of motion.   Skin:     Comments: Well healed biopsy sites on the left and right trapezius area   Neurological:      Mental Status: She is alert.         Labs   Latest Reference Range & Units 09/18/23 08:59   WBC 4.8 - 10.8 K/uL 4.3 (L)   RBC 4.20 - 5.40 M/uL 4.45   Hemoglobin 12.0 - 16.0 g/dL 14.3   Hematocrit 37.0 - 47.0 % 43.7   MCV 81.4 - 97.8 fL 98.2 (H)   MCH 27.0 - 33.0 pg 32.1   MCHC 32.2 - 35.5 g/dL 32.7   RDW 35.9 - 50.0 fL 45.2   Platelet Count 164 - 446 K/uL 235   MPV 9.0 - 12.9 fL 10.5   (L): Data is abnormally low  (H): Data is abnormally high     Latest Reference Range & Units 09/18/23 08:59   Sodium 135 - 145 mmol/L 138   Potassium 3.6 - 5.5 mmol/L 4.3   Chloride 96 - 112 mmol/L 103   Co2 20 - 33 mmol/L 26 "   Anion Gap 7.0 - 16.0  9.0   Glucose 65 - 99 mg/dL 94   Bun 8 - 22 mg/dL 13   Creatinine 0.50 - 1.40 mg/dL 0.75   GFR (CKD-EPI) >60 mL/min/1.73 m 2 85   Calcium 8.5 - 10.5 mg/dL 8.9   Correct Calcium 8.5 - 10.5 mg/dL 8.7   AST(SGOT) 12 - 45 U/L 18   ALT(SGPT) 2 - 50 U/L 11   Alkaline Phosphatase 30 - 99 U/L 53   Total Bilirubin 0.1 - 1.5 mg/dL 1.2   Albumin 3.2 - 4.9 g/dL 4.3   Total Protein 6.0 - 8.2 g/dL 6.6   Globulin 1.9 - 3.5 g/dL 2.3   A-G Ratio g/dL 1.9      Imaging  N/A    Pathology  PATH 3/3/25        Procedures  3/3/25 Shave biopsy left trapezial neck & right trapezial neck    Diagnosis:     1. Basal cell carcinoma (BCC) of skin of neck  Dermabond    lidocaine-epinephrine 1% 1:221585 injection 10 mL    Referral to Oncology Psychosocial Screening for Distress    Consent for all Surgical, Special Diagnostic or Therapeutic Procedures    Consent for all Surgical, Special Diagnostic or Therapeutic Procedures    Pathology Specimen    Pathology Specimen    Pathology Specimen    Pathology Specimen          Medical Decision Making:  Today's Assessment / Status / Plan:     69F with 2 new basal cell carcinomas identified on shave biopsy.  She requires a wide excision to negative margins of both of these lesions.      The risks, benefits and alternatives of wide excison of left neck basal cell carcinoma and right neck basal cell carcinoma were described in detail.  Including but not limited to bleeding, infection, wound healing issues, recurrence of disease, need for further procedures.  All of her questions answered.  She was in agreement to proceed.  Consent signed in clinic.    I, Moshe Paul MD have entered, reviewed and confirmed the above diagnosis related to this patient on this date of service, April 15, 2025     Moshe Paul M.D.

## 2025-04-10 ENCOUNTER — PATIENT OUTREACH (OUTPATIENT)
Dept: ONCOLOGY | Facility: MEDICAL CENTER | Age: 70
End: 2025-04-10
Payer: COMMERCIAL

## 2025-04-10 NOTE — PROGRESS NOTES
Cancer Nurse Navigation Assessment:      Assessment/Discussion: Call placed to patient, introduced self and explained role of navigator.  Patient stated that she does not have cancer and that its just a basal cell.  She states that she does not have melanoma.  Thanked [facility] for my call.    TRANSPORTATION: denied  FINANCIAL:    SUPPORT SYSTEM:    PSYCHOLOGICAL:                                                   DST: Not administered    Communication preference: Not assessed    Resources Provided/Intervention:

## 2025-04-15 ENCOUNTER — HOSPITAL ENCOUNTER (OUTPATIENT)
Facility: MEDICAL CENTER | Age: 70
End: 2025-04-15
Attending: SURGERY
Payer: COMMERCIAL

## 2025-04-15 ENCOUNTER — OFFICE VISIT (OUTPATIENT)
Facility: MEDICAL CENTER | Age: 70
End: 2025-04-15
Payer: COMMERCIAL

## 2025-04-15 VITALS
OXYGEN SATURATION: 96 % | HEIGHT: 66 IN | BODY MASS INDEX: 16.65 KG/M2 | TEMPERATURE: 98.1 F | DIASTOLIC BLOOD PRESSURE: 58 MMHG | SYSTOLIC BLOOD PRESSURE: 88 MMHG | WEIGHT: 103.62 LBS | HEART RATE: 70 BPM

## 2025-04-15 DIAGNOSIS — C44.41 BASAL CELL CARCINOMA (BCC) OF SKIN OF NECK: ICD-10-CM

## 2025-04-15 LAB — PATHOLOGY CONSULT NOTE: NORMAL

## 2025-04-15 PROCEDURE — 99205 OFFICE O/P NEW HI 60 MIN: CPT | Mod: 25 | Performed by: SURGERY

## 2025-04-15 PROCEDURE — 11626 EXC S/N/H/F/G MAL+MRG >4 CM: CPT | Performed by: SURGERY

## 2025-04-15 PROCEDURE — 11624 EXC S/N/H/F/G MAL+MRG 3.1-4: CPT | Performed by: SURGERY

## 2025-04-15 PROCEDURE — 700101 HCHG RX REV CODE 250

## 2025-04-15 PROCEDURE — 3078F DIAST BP <80 MM HG: CPT | Performed by: SURGERY

## 2025-04-15 PROCEDURE — 88305 TISSUE EXAM BY PATHOLOGIST: CPT | Mod: 26 | Performed by: STUDENT IN AN ORGANIZED HEALTH CARE EDUCATION/TRAINING PROGRAM

## 2025-04-15 PROCEDURE — 88305 TISSUE EXAM BY PATHOLOGIST: CPT | Performed by: STUDENT IN AN ORGANIZED HEALTH CARE EDUCATION/TRAINING PROGRAM

## 2025-04-15 PROCEDURE — 3074F SYST BP LT 130 MM HG: CPT | Performed by: SURGERY

## 2025-04-15 ASSESSMENT — FIBROSIS 4 INDEX: FIB4 SCORE: 1.64

## 2025-04-18 ENCOUNTER — PATIENT OUTREACH (OUTPATIENT)
Dept: ONCOLOGY | Facility: MEDICAL CENTER | Age: 70
End: 2025-04-18
Payer: COMMERCIAL

## 2025-04-18 NOTE — PROGRESS NOTES
On April 18, 2025, , Iliana Pablo, called pt. via telephone. SYD Pablo introduced self and explained the role of  at the [facility]. Pt. stated that she does not need any assistance. Pt. stated that her margins are clear and that her follow up is with her dermatologist. Pt. appreciated phone call but declined to speak further about resources.

## 2025-04-21 ASSESSMENT — ENCOUNTER SYMPTOMS
CONSTIPATION: 0
COUGH: 0
BRUISES/BLEEDS EASILY: 0
HALLUCINATIONS: 0
SENSORY CHANGE: 0
NAUSEA: 0
BLURRED VISION: 0
SPEECH CHANGE: 0
EYE DISCHARGE: 0
EYE PAIN: 0
BACK PAIN: 0
DEPRESSION: 0
HEADACHES: 0
NECK PAIN: 0
ORTHOPNEA: 0
PHOTOPHOBIA: 0
DIZZINESS: 0
WEIGHT LOSS: 0
CHILLS: 0
HEMOPTYSIS: 0
NERVOUS/ANXIOUS: 0
TINGLING: 0
VOMITING: 0
ABDOMINAL PAIN: 0
MYALGIAS: 0
DOUBLE VISION: 0
DIARRHEA: 0
HEARTBURN: 0
FOCAL WEAKNESS: 0
CLAUDICATION: 0
TREMORS: 0
SPUTUM PRODUCTION: 0
FEVER: 0
PALPITATIONS: 0

## 2025-04-21 ASSESSMENT — LIFESTYLE VARIABLES: SUBSTANCE_ABUSE: 0

## 2025-04-21 NOTE — PROCEDURES
Date of Operation: 4/15/2025    Preoperative Diagnosis:   Left neck basal cell carcinoma  Right neck basal cell carcinoma     Postoperative Diagnosis:   Same    Operative Procedure:    Wide excision left neck basal cell carcinoma, 3.5 cm x 7 cm  Wide excision right neck basal carcinoma, 4.5 cm x 8 cm      Wound class: Clean    Surgeon: Moshe Paul MD    Anesthesia: Local, 1% lidocaine with epinephrine     Estimated Blood Loss:  <5 cc    Specimens:   Left neck basal cell carcinoma  Right neck basal cell carcinoma      Findings: Well healed biopsy sites on the right and left neck.  4 mm margins were marked out circumferentially.        Counts: Sponge, needle, and instrument counts were reported correct at the conclusion of the operation x2.       Indication:     DESCRIPTION OF PROCEDURE   The patient was brought to the exam room and positioned to expose both lesions.  The neck and upper back were prepped and draped in normal sterile fashion.  Time out was performed.    The biopsy site on the left neck was identified and marked out circumferentially.  4 mm margins were marked out circumferentially and an ellipse was created.  The area measured 3.5 x 7 cm.  The area was infiltrated with 1% lidocaine with epinephrine.  An incision was made around the ellipse through the dermis.  Using sharp dissection the skin was taken off with a small amount of subcutaneous fat.  It was passed off the field for permanent specimen.  The area was irrigated and hemostasis was ensured.  Additional local anesthetic was injected throughout.  The skin was then closed with 3-0 deep dermal vicryls, 4-0 monocryl subcuticular and dermabond.    Attention was then turned to the right neck. The biopsy site on the right neck was identified and marked out circumferentially.  4 mm margins were marked out circumferentially and an ellipse was created.  The area measured 4.5 x 8 cm.  The area was infiltrated with 1% lidocaine with epinephrine.  An  incision was made around the ellipse through the dermis.  Using sharp dissection the skin was taken off with a small amount of subcutaneous fat.  It was passed off the field for permanent specimen.  The area was irrigated and hemostasis was ensured.  Additional local anesthetic was injected throughout.  The skin was then closed with 3-0 deep dermal vicryls, 4-0 monocryl subcuticular and dermabond.    Patient tolerated the procedure well.    Disposition: Patient was discharged from clinic doing well.    Moshe Paul MD  April 21, 2025, 8:27 AM

## 2025-04-22 ENCOUNTER — TELEPHONE (OUTPATIENT)
Dept: MEDICAL GROUP | Facility: MEDICAL CENTER | Age: 70
End: 2025-04-22
Payer: COMMERCIAL

## 2025-04-22 DIAGNOSIS — K62.89 ANAL PAIN: ICD-10-CM

## 2025-04-24 ENCOUNTER — TELEPHONE (OUTPATIENT)
Dept: PHARMACY | Facility: MEDICAL CENTER | Age: 70
End: 2025-04-24
Payer: COMMERCIAL

## 2025-04-25 PROCEDURE — RXMED WILLOW AMBULATORY MEDICATION CHARGE: Performed by: PSYCHIATRY & NEUROLOGY

## 2025-04-28 ENCOUNTER — PHARMACY VISIT (OUTPATIENT)
Dept: PHARMACY | Facility: MEDICAL CENTER | Age: 70
End: 2025-04-28
Payer: COMMERCIAL

## 2025-04-29 ENCOUNTER — OFFICE VISIT (OUTPATIENT)
Facility: MEDICAL CENTER | Age: 70
End: 2025-04-29
Payer: COMMERCIAL

## 2025-04-29 VITALS
OXYGEN SATURATION: 97 % | DIASTOLIC BLOOD PRESSURE: 60 MMHG | HEART RATE: 67 BPM | TEMPERATURE: 97.3 F | BODY MASS INDEX: 16.65 KG/M2 | WEIGHT: 103.62 LBS | HEIGHT: 66 IN | SYSTOLIC BLOOD PRESSURE: 102 MMHG

## 2025-04-29 DIAGNOSIS — C44.41 BASAL CELL CARCINOMA (BCC) OF SKIN OF NECK: ICD-10-CM

## 2025-04-29 DIAGNOSIS — Z98.890 HISTORY OF MELANOMA EXCISION: ICD-10-CM

## 2025-04-29 DIAGNOSIS — Z85.820 HISTORY OF MELANOMA EXCISION: ICD-10-CM

## 2025-04-29 ASSESSMENT — ENCOUNTER SYMPTOMS
FEVER: 0
NAUSEA: 0
VOMITING: 0
SHORTNESS OF BREATH: 0
COUGH: 0
CHILLS: 0
NECK PAIN: 1

## 2025-04-29 ASSESSMENT — FIBROSIS 4 INDEX: FIB4 SCORE: 1.64

## 2025-04-29 NOTE — Clinical Note
Member Name: Rosa Walker   Member Number: [id number]   Reference Number: [id number]   Approved Services: Echos and EKG   Approved Service Dates: 02/10/2025 - 08/27/2025   Requesting Provider: Jenny Blake   Requested Provider: [facility]     Dear Rosa Damondon:     The following medical service(s) requested by Jenny Blake have been approved:    Procedure Code Procedure Code Name Requested Quantity Approved Quantity Status   36248 (CPT®) MT ECHO HEART XTHORACIC,COMPLETE W DOPPLER 1 1 Authorized       Approved Quantity means the number of visits approved for medication treatments and/or medical services.    The services should be provided by [facility] no later than 08/27/2025. Please contact the provider listed below with any questions.     Provider Information:  [facility]  [phone]    Your plan benefit may require a deductible, co-payment or coinsurance for these services. This authorization does not guarantee [facility] will pay the claim for services that you receive. Payment by [facility] for these services is subject to the terms of your Evidence of Coverage or Summary Plan Description, your eligibility at the time of service, and confirmation of benefit coverage.    For any questions or additional information, please contact Customer Service:    [facility]  Customer Service: [phone] or toll free [phone]  TTY users dial: [phone]   Call Center Hours: Mon - Fri 7 AM to 8 PM PST   Office Hours: Mon - Fri 8 AM to 5 PM [facility]   E-mail: [email or web address]   Website: [email or web address]       This information is available for free in other languages. Please contact Customer Service at the phone number above for more information. [facility] complies with applicable Federal civil rights laws and does not discriminate on the basis of race, color, national origin, age, disability or  sex.      Sincerely,     Healthcare Utilization Management Department     Cc: [facility]   Jenny Blake

## 2025-04-29 NOTE — PROGRESS NOTES
Subjective:   4/29/2025  9:18 AM  Primary care physician: Jenny Blake M.D.  Referring Provider: Nazia CONNOLLY ([facility])     Chief Complaint:   Chief Complaint   Patient presents with    Post-op     BCC L, R TRAPEZIUS NECK  RESECT IN-OFFICE 4/15  PATH: NEG  WOUND CHECK        Diagnosis:   1. Basal cell carcinoma (BCC) of skin of neck        2. History of melanoma excision            History of presenting illness:    Rosa Walker is a pleasant 69 y.o. female who is referred by her dermatologist for evaluation of 2 skin cancers needing treatment.     Shave biopsy of left and right trapezial neck on 3/3/25 noted basal cell carcinoma, nodular type.     She is here today for surgical excision.  These lesions were identified by her dermatologist at the time of a total body skin exam.  She has had basal cell carcinoma in the past which have been resected.  No significant cancer history other than the basal cell carcinomas     Update 4/29/25  On 4/15/25 she underwent an in-office procedure by Dr. Paul, including:  Wide excision left neck basal cell carcinoma, 3.5 cm x 7 cm  Wide excision right neck basal carcinoma, 4.5 cm x 8 cm  Pathology was negative for residual carcinoma.    She is here today for wound check. She is concerned about tenderness and redness on both incision sites along with some swelling on the left neck incision. She states using Voltaren gel with mild relief and denies fever, chills, nausea, vomiting or presence of drainage from the incision sites.    Past Medical History:   Diagnosis Date    Arthritis     Cataract     IOL OU    Depression 07/16/2013    Glaucoma     H/O OU, Treated With Laser    Hot flashes 07/16/2013    Psychiatric problem     depression     Past Surgical History:   Procedure Laterality Date    WA LAP,DIAGNOSTIC ABDOMEN Bilateral 9/28/2023    Procedure: LAPAROSCOPICALLY ASSISTED BILATERAL SALPINGO OOPHORECTOMY;   Surgeon: Margi Olivarez M.D.;  Location: SURGERY SAME DAY [facility];  Service: Gynecology    CATARACT PHACO WITH IOL  05/07/2012    Performed by GENO ARREDONDO at SURGERY [facility]S    CATARACT PHACO WITH IOL  04/16/2012    Performed by GENO ARREDONDO at SURGERY [facility] ORS    KNEE ARTHROSCOPY  09/09/2010    Performed by FLORI CHILDRESS at SURGERY [facility] ORS    MENISCECTOMY  09/09/2010    Performed by FLORI CHILDRESS at SURGERY [facility] ORS    OTHER SURGICAL PROCEDURE  06/01/2005    Full thickness skin graft left politeal fossa    RETINAL DETACHMENT REPAIR  03/01/1992    left     EYE SURGERY      SCLERAL BUCKLING Left      Allergies   Allergen Reactions    Nkda [No Known Drug Allergy]      Outpatient Encounter Medications as of 4/29/2025   Medication Sig Dispense Refill    Fremanezumab-vfrm (AJOVY) 225 MG/1.5ML Solution Auto-injector Inject 1.5 mL under the skin every 30 (thirty) days. 1.5 mL 11    Ubrogepant (UBRELVY) 100 MG Tab Take 1 tablet at onset of headache, may repeat dose in 2 hours if unrelieved. Do not exceed more than 2 tablets in 24 hours.  Indications: Migraine Headache 12 Tablet 11    estradiol-norethindrone (COMBIPATCH) 0.05-0.14 MG/DAY patch Place 1 Patch on the skin two times a week. 12 Patch 1    Vortioxetine HBr (TRINTELLIX) 5 MG Tab Take 1 Tablet by mouth every day. 90 Tablet 3    valacyclovir (VALTREX) 1 GM Tab Take 1 Tablet by mouth every 8 hours as needed.      COMBIPATCH 0.05-0.14 MG/DAY patch two times a week.      Prasterone (INTRAROSA) 6.5 MG INSERT Insert 6.5 mg into the vagina as needed.      diazePAM (VALIUM) 5 MG Tab Take 5 mg by mouth every 6 hours as needed.       No facility-administered encounter medications on file as of 4/29/2025.     Social History     Socioeconomic History    Marital status:      Spouse name: Not on file    Number of children: Not on file    Years of education: Not on file    Highest education level: Not on file    Occupational History    Not on file   Tobacco Use    Smoking status: Former     Current packs/day: 0.00     Average packs/day: 0.5 packs/day for 10.0 years (5.0 ttl pk-yrs)     Types: Cigarettes     Start date: 1975     Quit date: 1985     Years since quittin.3     Passive exposure: Never    Smokeless tobacco: Never   Vaping Use    Vaping status: Never Used   Substance and Sexual Activity    Alcohol use: Not Currently     Alcohol/week: 3.0 oz     Types: 5 Glasses of wine per week     Comment: 5 glass wine per week    Drug use: No    Sexual activity: Yes     Partners: Male   Other Topics Concern    Not on file   Social History Narrative    Not on file     Social Drivers of Health     Financial Resource Strain: Not on file   Food Insecurity: Not on file   Transportation Needs: Not on file   Physical Activity: Not on file   Stress: Not on file   Social Connections: Not on file   Intimate Partner Violence: Not on file   Housing Stability: Not on file      Social History     Tobacco Use   Smoking Status Former    Current packs/day: 0.00    Average packs/day: 0.5 packs/day for 10.0 years (5.0 ttl pk-yrs)    Types: Cigarettes    Start date: 1975    Quit date: 1985    Years since quittin.3    Passive exposure: Never   Smokeless Tobacco Never     Social History     Substance and Sexual Activity   Alcohol Use Not Currently    Alcohol/week: 3.0 oz    Types: 5 Glasses of wine per week    Comment: 5 glass wine per week     Social History     Substance and Sexual Activity   Drug Use No      Family History   Problem Relation Age of Onset    Cancer Mother 74        ovarian    Other Father         sepsis       Review of Systems   Constitutional:  Negative for chills, fever and malaise/fatigue.   Respiratory:  Negative for cough and shortness of breath.    Cardiovascular:  Negative for chest pain.   Gastrointestinal:  Negative for nausea and vomiting.   Musculoskeletal:  Positive for neck pain (Tenderness  "to both neck incision sites).   Skin:  Negative for itching and rash.        Swelling to left neck incision site        Objective:   /60 (BP Location: Right arm, Patient Position: Sitting, BP Cuff Size: Adult)   Pulse 67   Temp 36.3 °C (97.3 °F) (Temporal)   Ht 1.676 m (5' 6\")   Wt 47 kg (103 lb 9.9 oz)   SpO2 97%   BMI 16.72 kg/m²     Physical Exam  Vitals reviewed.   Constitutional:       General: She is not in acute distress.     Appearance: Normal appearance. She is not ill-appearing.   HENT:      Head: Normocephalic and atraumatic.   Eyes:      General: No scleral icterus.     Conjunctiva/sclera: Conjunctivae normal.   Cardiovascular:      Rate and Rhythm: Normal rate.   Pulmonary:      Effort: Pulmonary effort is normal. No respiratory distress.   Musculoskeletal:         General: Normal range of motion.      Cervical back: Normal range of motion. Tenderness (Mild to bilateral incision sites) present.   Skin:     General: Skin is warm and dry.      Comments: Mild swelling to left neck incision  Mild redness to bilateral neck incisions  No drainage noted on either incision  Update clinical pictures uploaded to patient's chart   Neurological:      Mental Status: She is alert and oriented to person, place, and time.   Psychiatric:         Mood and Affect: Mood normal.         Behavior: Behavior normal.       Labs    Latest Reference Range & Units 09/18/23 08:59   WBC 4.8 - 10.8 K/uL 4.3 (L)   RBC 4.20 - 5.40 M/uL 4.45   Hemoglobin 12.0 - 16.0 g/dL 14.3   Hematocrit 37.0 - 47.0 % 43.7   MCV 81.4 - 97.8 fL 98.2 (H)   MCH 27.0 - 33.0 pg 32.1   MCHC 32.2 - 35.5 g/dL 32.7   RDW 35.9 - 50.0 fL 45.2   Platelet Count 164 - 446 K/uL 235   MPV 9.0 - 12.9 fL 10.5   (L): Data is abnormally low  (H): Data is abnormally high       Latest Reference Range & Units 09/18/23 08:59   Sodium 135 - 145 mmol/L 138   Potassium 3.6 - 5.5 mmol/L 4.3   Chloride 96 - 112 mmol/L 103   Co2 20 - 33 mmol/L 26   Anion Gap 7.0 - 16.0  " 9.0   Glucose 65 - 99 mg/dL 94   Bun 8 - 22 mg/dL 13   Creatinine 0.50 - 1.40 mg/dL 0.75   GFR (CKD-EPI) >60 mL/min/1.73 m 2 85   Calcium 8.5 - 10.5 mg/dL 8.9   Correct Calcium 8.5 - 10.5 mg/dL 8.7   AST(SGOT) 12 - 45 U/L 18   ALT(SGPT) 2 - 50 U/L 11   Alkaline Phosphatase 30 - 99 U/L 53   Total Bilirubin 0.1 - 1.5 mg/dL 1.2   Albumin 3.2 - 4.9 g/dL 4.3   Total Protein 6.0 - 8.2 g/dL 6.6   Globulin 1.9 - 3.5 g/dL 2.3   A-G Ratio g/dL 1.9      Imaging  N/A     Pathology  PATH 4/15/25  FINAL DIAGNOSIS:  A. Wide excision right neck biopsy:          Scar consistent with prior procedure in a background of solar           elastosis          Negative for residual carcinoma   B. Wide excision left neck biopsy:          Scar consistent with prior procedure in a background of solar           elastosis          Negative for residual carcinoma     PATH 3/3/25         Procedures  4/15/25 by Dr. Paul  Wide excision left neck basal cell carcinoma, 3.5 cm x 7 cm  Wide excision right neck basal carcinoma, 4.5 cm x 8 cm    3/3/25 Shave biopsy left trapezial neck & right trapezial neck    Diagnosis:     1. Basal cell carcinoma (BCC) of skin of neck        2. History of melanoma excision            Medical Decision Making:  Today's Assessment / Status / Plan:     Overall, Rosa Walker appears to be recovering well from recent surgery. The patient is tolerating a regular diet and is back to basic daily activities.     Surgical pain appears to be controlled, and they deny N/V. The surgical incisions appear to be healing as expected.     The patient was also seen and assessed by Dr. Paul. Remaining pieces of dermabond along with an exposed piece of suture were removed and the surgical wounds do not appear infected at this time. Encouraged patient to liberally apply moisturizer around the surgical incisions and that she can continue to use Voltaren as needed.    We discussed the pathology findings from the procedure which were  negative for residual carcinoma.    The patient will follow up in our clinic on an as-needed basis. Instructed to contact our office and/or seek urgent medical care should she experience new or concerning symptoms.    I, Ortega Cai, have entered, reviewed and confirmed the above diagnoses related to this patient on this date of service, as per the time and date noted at top of this note.

## 2025-05-06 PROCEDURE — RXMED WILLOW AMBULATORY MEDICATION CHARGE: Performed by: PSYCHIATRY & NEUROLOGY

## 2025-05-08 ENCOUNTER — OFFICE VISIT (OUTPATIENT)
Dept: MEDICAL GROUP | Facility: MEDICAL CENTER | Age: 70
End: 2025-05-08
Payer: COMMERCIAL

## 2025-05-08 ENCOUNTER — PHARMACY VISIT (OUTPATIENT)
Dept: PHARMACY | Facility: MEDICAL CENTER | Age: 70
End: 2025-05-08
Payer: COMMERCIAL

## 2025-05-08 VITALS
HEIGHT: 66 IN | SYSTOLIC BLOOD PRESSURE: 98 MMHG | DIASTOLIC BLOOD PRESSURE: 60 MMHG | TEMPERATURE: 98.2 F | BODY MASS INDEX: 16.72 KG/M2

## 2025-05-08 DIAGNOSIS — Z71.84 COUNSELING FOR TRAVEL: ICD-10-CM

## 2025-05-08 PROCEDURE — 3074F SYST BP LT 130 MM HG: CPT | Performed by: FAMILY MEDICINE

## 2025-05-08 PROCEDURE — 99214 OFFICE O/P EST MOD 30 MIN: CPT | Performed by: FAMILY MEDICINE

## 2025-05-08 PROCEDURE — 3078F DIAST BP <80 MM HG: CPT | Performed by: FAMILY MEDICINE

## 2025-05-08 RX ORDER — DIAZEPAM 5 MG/1
5 TABLET ORAL EVERY 6 HOURS PRN
Qty: 30 TABLET | Refills: 1 | Status: SHIPPED | OUTPATIENT
Start: 2025-05-08 | End: 2025-06-07

## 2025-05-08 NOTE — PROGRESS NOTES
This medical record contains text that has been entered with the assistance of computer voice recognition and dictation software.  Therefore, it may contain unintended errors in text, spelling, punctuation, or grammar        Chief Complaint   Patient presents with    Other     Medication for travel,   Prior Auth       Rosa Walker is a 69 y.o. female here evaluation and management of:       History of Present Illness  The patient presents for evaluation of anxiety, depression, near syncope, and migraine.    She seeks diazepam for travel-related anxiety, experiencing heightened anxiety upon arrival. During a recent trip to [place], she had nighttime restlessness. She plans to visit several locations in 06/2025 and is concerned about sleep disturbances. Previously prescribed diazepam 2.5 mg or 5 mg, occasionally divided into half doses.    Currently on Trintellix, effective for mental health but costly (tier 3 drug). She provided a prior authorization form for assistance. On Trintellix for ~6 years. Tried Lexapro, Cymbalta, and Celexa, all ineffective.    Scheduled for an echocardiogram due to near syncope. Considering a new exercise regimen. Curious about mitral valve prolapse and low blood pressure. Today's BP: 98/60.    Under neurologist care for migraines. Experiences visual migraines since menopause. On Ajovy and Ubrelvy, more effective than Imitrex without inducing fatigue. Regular Ajovy use prevents breakthrough migraines.    PAST SURGICAL HISTORY: Hysterectomy    FAMILY HISTORY  Her mother had ovarian cancer.        Current Outpatient Medications   Medication Sig Dispense Refill    diazePAM (VALIUM) 5 MG Tab Take 1 Tablet by mouth every 6 hours as needed for Anxiety for up to 30 days. 30 Tablet 1    Fremanezumab-vfrm (AJOVY) 225 MG/1.5ML Solution Auto-injector Inject 1.5 mL under the skin every 30 (thirty) days. 1.5 mL 11    Ubrogepant (UBRELVY) 100 MG Tab Take 1 tablet at onset of headache, may  repeat dose in 2 hours if unrelieved. Do not exceed more than 2 tablets in 24 hours.  Indications: Migraine Headache 12 Tablet 11    estradiol-norethindrone (COMBIPATCH) 0.05-0.14 MG/DAY patch Place 1 Patch on the skin two times a week. 12 Patch 1    Vortioxetine HBr (TRINTELLIX) 5 MG Tab Take 1 Tablet by mouth every day. 90 Tablet 3    diazePAM (VALIUM) 5 MG Tab Take 5 mg by mouth every 6 hours as needed.      valacyclovir (VALTREX) 1 GM Tab Take 1 Tablet by mouth every 8 hours as needed.      COMBIPATCH 0.05-0.14 MG/DAY patch two times a week.      Prasterone (INTRAROSA) 6.5 MG INSERT Insert 6.5 mg into the vagina as needed.       No current facility-administered medications for this visit.     Patient Active Problem List    Diagnosis Date Noted    Near syncope 02/10/2025    HSV infection 02/10/2025    Abnormal EKG 10/03/2023    Vision problem 03/14/2023    Right knee pain 02/24/2023    Anxiety 02/24/2023    Preventative health care 05/06/2022    Cervicalgia 06/15/2021    Cervical myofascial pain syndrome 06/15/2021    Bilateral occipital neuralgia 06/15/2021    Chronic migraine with aura 11/12/2020    Ocular migraine 11/12/2020    Migraine 09/30/2020    Atrophic vaginitis 05/12/2020    Open-angle glaucoma 08/17/2018    Osteopenia of necks of both femurs 08/17/2018    Annual physical exam 08/17/2018    Neck pain 10/02/2014    Family history of ovarian cancer 10/02/2014    Depression 07/16/2013    Hot flashes 07/16/2013     Past Surgical History:   Procedure Laterality Date    ME LAP,DIAGNOSTIC ABDOMEN Bilateral 9/28/2023    Procedure: LAPAROSCOPICALLY ASSISTED BILATERAL SALPINGO OOPHORECTOMY;  Surgeon: Margi Olivarez M.D.;  Location: SURGERY SAME DAY [facility] [place];  Service: Gynecology    CATARACT PHACO WITH IOL  05/07/2012    Performed by GENO ARREDONDO at SURGERY [facility]    CATARACT PHACO WITH IOL  04/16/2012    Performed by GENO ARREDONDO at [facility]    KNEE ARTHROSCOPY  09/09/2010  "   Performed by FLORI CHILDRESS at SURGERY [facility] ORS    MENISCECTOMY  2010    Performed by FLORI CHILDRESS at SURGERY [facility] ORS    OTHER SURGICAL PROCEDURE  2005    Full thickness skin graft left politeal fossa    RETINAL DETACHMENT REPAIR  1992    left     EYE SURGERY      SCLERAL BUCKLING Left       Social History     Tobacco Use    Smoking status: Former     Current packs/day: 0.00     Average packs/day: 0.5 packs/day for 10.0 years (5.0 ttl pk-yrs)     Types: Cigarettes     Start date: 1975     Quit date: 1985     Years since quittin.3     Passive exposure: Never    Smokeless tobacco: Never   Vaping Use    Vaping status: Never Used   Substance Use Topics    Alcohol use: Not Currently     Alcohol/week: 3.0 oz     Types: 5 Glasses of wine per week     Comment: 5 glass wine per week    Drug use: No     Family History   Problem Relation Age of Onset    Cancer Mother 74        ovarian    Other Father         sepsis           ROS    all review of system completed and negative except for those listed above     Objective:     BP 98/60 (BP Location: Right arm, Patient Position: Sitting, BP Cuff Size: Adult)   Temp 36.8 °C (98.2 °F) (Temporal)   Ht 1.676 m (5' 6\")  Body mass index is 16.72 kg/m².  Physical Exam:        GEN: comfortable, alert and oriented, well nourished, well developed, in no apparent distress   HEENT: NCAT, eyes: pupils equal and reactive, sclera white, EOMIT, good dentition  HEART: limbs warm and well perfused, regular rate, no JVD, no lower extremity edema  LUNGS: speaking in full sentences, not in apparent respiratory distress, no audible wheezes  MSK: normal tone and bulk, no swelling of the joints, gait steady and normal       Assessment and Plan:   The following treatment plan was discussed        Assessment & Plan  Counseling for travel    Orders:    diazePAM (VALIUM) 5 MG Tab; Take 1 Tablet by mouth every 6 hours as needed for Anxiety for up to 30 " days.        Assessment & Plan  1.travel advice encounter  Prescribed diazepam 5 mg, quantity 30, for travel-related anxiety/insomnia.  Prescription sent to pharmacy.  Discussed diazepam effectiveness during travel.  Patient prefers diazepam 5 mg, can split if needed.    2. Depression.  On Trintellix for ~6 years with significant symptom improvement.  Will do Prior authorization form for Trintellix .  Tried and failed Lexapro, Cymbalta, and Celexa.  Discussed Trintellix cost and need for prior authorization to reduce co-pay.    3. Near syncope.  Scheduled for resting echocardiogram tomorrow.  BP: 98/60.  Discussed potential causes, including low BP.  Encouraged increased salt intake and symptom monitoring.    4. Migraine.  Using Ajovy monthly and Ubrelvy as needed.  Reports significant improvement and no side effects.  Beneficial follow-up with neurologist.  Discussed Ajovy and Ubrelvy effectiveness in preventing and managing migraines.          Instructed to follow up if symptoms worsen or fail to improve, ER/UC precautions discussed as well    Jenny Blake MD  [facility], Family Medicine   [address] [place], [place] [address]y   Tadeo KHAN [id number]  Phone: [phone]

## 2025-05-09 ENCOUNTER — HOSPITAL ENCOUNTER (OUTPATIENT)
Dept: CARDIOLOGY | Facility: MEDICAL CENTER | Age: 70
End: 2025-05-09
Attending: FAMILY MEDICINE
Payer: COMMERCIAL

## 2025-05-09 DIAGNOSIS — R55 NEAR SYNCOPE: ICD-10-CM

## 2025-05-09 DIAGNOSIS — R42 DIZZINESS: ICD-10-CM

## 2025-05-09 LAB
LV EJECT FRACT  99904: 60
LV EJECT FRACT MOD 2C 99903: 65.69
LV EJECT FRACT MOD 4C 99902: 57.34
LV EJECT FRACT MOD BP 99901: 62.74

## 2025-05-09 PROCEDURE — 93306 TTE W/DOPPLER COMPLETE: CPT | Mod: 26 | Performed by: INTERNAL MEDICINE

## 2025-05-09 PROCEDURE — 93306 TTE W/DOPPLER COMPLETE: CPT

## 2025-05-20 ENCOUNTER — TELEPHONE (OUTPATIENT)
Dept: MEDICAL GROUP | Facility: MEDICAL CENTER | Age: 70
End: 2025-05-20
Payer: COMMERCIAL

## 2025-05-20 DIAGNOSIS — K62.3 RECTAL PROLAPSE: Primary | ICD-10-CM

## 2025-05-21 PROCEDURE — RXMED WILLOW AMBULATORY MEDICATION CHARGE: Performed by: PSYCHIATRY & NEUROLOGY

## 2025-05-27 ENCOUNTER — PHARMACY VISIT (OUTPATIENT)
Dept: PHARMACY | Facility: MEDICAL CENTER | Age: 70
End: 2025-05-27
Payer: COMMERCIAL

## 2025-06-04 NOTE — PROGRESS NOTES
Subjective:   2025  2:20 PM  Primary care physician:Jenny Blake M.D.  Referring Provider: Jenny Blkae M.D.    Chief Complaint:   Chief Complaint   Patient presents with    New Patient     NP- anorectal pain   colonoscopy @ [facility]        Diagnosis:   1. Anorectal pain        2. Basal cell carcinoma (BCC) of skin of neck        3. Constipation, unspecified constipation type            History of presenting illness:  Rosa Walker  is a pleasant 70 y.o. year old female who presented with       Past Medical History[1]  Past Surgical History[2]  Allergies[3]  Encounter Medications[4]  Social History     Socioeconomic History    Marital status:      Spouse name: Not on file    Number of children: Not on file    Years of education: Not on file    Highest education level: Not on file   Occupational History    Not on file   Tobacco Use    Smoking status: Former     Current packs/day: 0.00     Average packs/day: 0.5 packs/day for 10.0 years (5.0 ttl pk-yrs)     Types: Cigarettes     Start date: 1975     Quit date: 1985     Years since quittin.4     Passive exposure: Never    Smokeless tobacco: Never   Vaping Use    Vaping status: Never Used   Substance and Sexual Activity    Alcohol use: Not Currently     Alcohol/week: 3.0 oz     Types: 5 Glasses of wine per week     Comment: 5 glass wine per week    Drug use: No    Sexual activity: Yes     Partners: Male   Other Topics Concern    Not on file   Social History Narrative    Not on file     Social Drivers of Health     Financial Resource Strain: Not on file   Food Insecurity: Not on file   Transportation Needs: Not on file   Physical Activity: Not on file   Stress: Not on file   Social Connections: Not on file   Intimate Partner Violence: Not on file   Housing Stability: Not on file      Tobacco Use History[5]  Social History     Substance and Sexual Activity   Alcohol Use Not Currently    Alcohol/week: 3.0 oz    Types: 5 Glasses of wine per week     "Comment: 5 glass wine per week     Social History     Substance and Sexual Activity   Drug Use No        Family History   Problem Relation Age of Onset    Cancer Mother 74        ovarian    Other Father         sepsis       Review of Systems   Constitutional:  Negative for chills, fever, malaise/fatigue and weight loss.   Eyes:  Negative for blurred vision.   Respiratory:  Negative for cough, hemoptysis and shortness of breath.    Cardiovascular:  Negative for chest pain and leg swelling.   Gastrointestinal:  Positive for constipation. Negative for blood in stool, diarrhea, nausea and vomiting.   Genitourinary:  Negative for dysuria, hematuria and urgency.   Musculoskeletal:  Negative for back pain, falls and joint pain.   Skin:  Negative for rash.   Neurological:  Negative for dizziness, weakness and headaches.   Endo/Heme/Allergies: Negative.  Does not bruise/bleed easily.   Psychiatric/Behavioral:  Negative for depression. The patient is not nervous/anxious.         Objective:   BP 96/60 (BP Location: Right arm, Patient Position: Sitting, BP Cuff Size: Adult)   Pulse 78   Temp 37.2 °C (98.9 °F) (Temporal)   Ht 1.676 m (5' 6\")   Wt 46.8 kg (103 lb 2.8 oz)   SpO2 96%   BMI 16.65 kg/m²     Physical Exam  Vitals and nursing note reviewed.   Constitutional:       Appearance: Normal appearance. She is normal weight.   HENT:      Head: Normocephalic.      Nose: Nose normal.      Mouth/Throat:      Mouth: Mucous membranes are moist.   Eyes:      Extraocular Movements: Extraocular movements intact.   Cardiovascular:      Rate and Rhythm: Normal rate and regular rhythm.      Pulses: Normal pulses.   Pulmonary:      Effort: Pulmonary effort is normal.   Abdominal:      General: Abdomen is flat.      Palpations: Abdomen is soft.   Genitourinary:     Rectum: Normal.      Comments: Small external hemorrhoids.  Hemorrhoidal prolapse with straining reduced spontaneously.  Grade 2 internal hemorrhoids without gross blood or " stigmata of recent bleeding.  Small grade 1 rectocele.  Rectal vault was full of firm stool.  No masses appreciated.  Musculoskeletal:         General: Normal range of motion.      Cervical back: Normal range of motion.   Skin:     General: Skin is warm and dry.   Neurological:      Mental Status: She is alert and oriented to person, place, and time.   Psychiatric:         Mood and Affect: Mood normal.         Behavior: Behavior normal.         Thought Content: Thought content normal.         Judgment: Judgment normal.     Light anoscopy  Informed verbal consent was obtained for this examination.  The patient was placed in a left lateral decubitus position.  Digital rectal examination was performed.  A generously lubricated rigid lighted anoscope was inserted into the patient's anus and a detailed inspection of all 4 quadrants was made.  The hemorrhoidal pedicles were easily visualized.  There was no gross blood or stigmata of recent bleeding.  No masses were identified in the anal canal and distal rectum.      Procedures:  Colonoscopy 01/24/2023      Diagnosis:     1. Anorectal pain        2. Basal cell carcinoma (BCC) of skin of neck        3. Constipation, unspecified constipation type                Medical Decision Making:  Today's Assessment / Status / Plan:     Patient has had a recent colonoscopy so we will evaluate with flexible sigmoidoscopy to determine current complaints of constipation and obstipation.  I do not believe the patient has rectal prolapse or rectocele.  After endoscopic evaluation we will consider radiographic studies to include MR defecography.  I have discussed with the patient the importance of changing her bowel habits.  High-fiber diet (25 g/day for women and 38 g/day for men)  Encourage regular sleep-wake cycles with exercise to maintain regular bowel habits  Add bulk forming agents such as psyllium with adequate p.o. fluid intake  Stool softener such as toxic sate or MiraLAX to  needed to treat constipation  Spend only 3 to 5 minutes on the toilet.  Do not read on the toilet  Avoid compulsive wiping or any wipes containing alcohol.  Recommend Balneol for anal hygiene.  Barrier protectant such as Calmoseptine may be useful to prevent further skin irritation.   Risks, benefits, and alternatives were discussed with consenting person(s). Consenting person(s) were given an opportunity to ask questions and discuss other options. Risks including but not limited to failed or incomplete planned procedure, ineffective therapy, perforation, infection, bleeding, missed lesion(s), missed diagnosis, cardiac and/or pulmonary event, aspiration, stroke, possible need for surgery, hospitalization possibly prolonged, discomfort, unsuccessful and/or incomplete procedure, possible need for repeat procedures and/or additional testings, damage to adjacent organs and/or vascular structures, medication reaction, disability, death, and other adverse events possibly life-threatening. Discussion was undertaken with Layman's terms. Consenting persons stated understanding and acceptance of these risks, and wished to proceed. Consent was given in clear state of mind.  Adama Weir MD PhD  [facility]  Colon and Rectal Surgeon  [phone]            [1]   Past Medical History:  Diagnosis Date    Arthritis     Cataract     IOL OU    Depression 07/16/2013    Glaucoma     H/O OU, Treated With Laser    Hot flashes 07/16/2013    Psychiatric problem     depression   [2]   Past Surgical History:  Procedure Laterality Date    NH LAP,DIAGNOSTIC ABDOMEN Bilateral 9/28/2023    Procedure: LAPAROSCOPICALLY ASSISTED BILATERAL SALPINGO OOPHORECTOMY;  Surgeon: Margi Olivarez M.D.;  Location: SURGERY SAME DAY [facility];  Service: Gynecology    CATARACT PHACO WITH IOL  05/07/2012    Performed by GENO ARREDONDO at SURGERY [facility] ORS    CATARACT PHACO WITH IOL  04/16/2012    Performed by GENO ARREDONDO at SURGERY  [facility]S    KNEE ARTHROSCOPY  2010    Performed by FLORI CHILDRESS at SURGERY [facility] ORS    MENISCECTOMY  2010    Performed by FLORI CHILDRESS at SURGERY [facility] ORS    OTHER SURGICAL PROCEDURE  2005    Full thickness skin graft left politeal fossa    RETINAL DETACHMENT REPAIR  1992    left     EYE SURGERY      SCLERAL BUCKLING Left    [3]   Allergies  Allergen Reactions    Nkda [No Known Drug Allergy]    [4]   Outpatient Encounter Medications as of 2025   Medication Sig Dispense Refill    Fremanezumab-vfrm (AJOVY) 225 MG/1.5ML Solution Auto-injector Inject 1.5 mL under the skin every 30 (thirty) days. 1.5 mL 11    Ubrogepant (UBRELVY) 100 MG Tab Take 1 tablet at onset of headache, may repeat dose in 2 hours if unrelieved. Do not exceed more than 2 tablets in 24 hours.  Indications: Migraine Headache 12 Tablet 11    estradiol-norethindrone (COMBIPATCH) 0.05-0.14 MG/DAY patch Place 1 Patch on the skin two times a week. 12 Patch 1    Vortioxetine HBr (TRINTELLIX) 5 MG Tab Take 1 Tablet by mouth every day. 90 Tablet 3    diazePAM (VALIUM) 5 MG Tab Take 5 mg by mouth every 6 hours as needed.      valacyclovir (VALTREX) 1 GM Tab Take 1 Tablet by mouth every 8 hours as needed.      Prasterone (INTRAROSA) 6.5 MG INSERT Insert 6.5 mg into the vagina as needed.      diazePAM (VALIUM) 5 MG Tab Take 1 Tablet by mouth every 6 hours as needed for Anxiety for up to 30 days. 30 Tablet 1    COMBIPATCH 0.05-0.14 MG/DAY patch two times a week.       No facility-administered encounter medications on file as of 2025.   [5]   Social History  Tobacco Use   Smoking Status Former    Current packs/day: 0.00    Average packs/day: 0.5 packs/day for 10.0 years (5.0 ttl pk-yrs)    Types: Cigarettes    Start date: 1975    Quit date: 1985    Years since quittin.4    Passive exposure: Never   Smokeless Tobacco Never

## 2025-06-05 ENCOUNTER — OFFICE VISIT (OUTPATIENT)
Facility: MEDICAL CENTER | Age: 70
End: 2025-06-05
Payer: COMMERCIAL

## 2025-06-05 VITALS
HEART RATE: 78 BPM | BODY MASS INDEX: 16.58 KG/M2 | DIASTOLIC BLOOD PRESSURE: 60 MMHG | OXYGEN SATURATION: 96 % | WEIGHT: 103.17 LBS | SYSTOLIC BLOOD PRESSURE: 96 MMHG | HEIGHT: 66 IN | TEMPERATURE: 98.9 F

## 2025-06-05 DIAGNOSIS — C44.41 BASAL CELL CARCINOMA (BCC) OF SKIN OF NECK: ICD-10-CM

## 2025-06-05 DIAGNOSIS — K59.00 CONSTIPATION, UNSPECIFIED CONSTIPATION TYPE: ICD-10-CM

## 2025-06-05 DIAGNOSIS — K62.89 ANORECTAL PAIN: Primary | ICD-10-CM

## 2025-06-05 PROCEDURE — 3074F SYST BP LT 130 MM HG: CPT | Performed by: SURGERY

## 2025-06-05 PROCEDURE — 46600 DIAGNOSTIC ANOSCOPY SPX: CPT | Performed by: SURGERY

## 2025-06-05 PROCEDURE — 99204 OFFICE O/P NEW MOD 45 MIN: CPT | Mod: 25 | Performed by: SURGERY

## 2025-06-05 PROCEDURE — 3078F DIAST BP <80 MM HG: CPT | Performed by: SURGERY

## 2025-06-05 ASSESSMENT — ENCOUNTER SYMPTOMS
WEAKNESS: 0
SHORTNESS OF BREATH: 0
DIARRHEA: 0
DEPRESSION: 0
VOMITING: 0
BLURRED VISION: 0
NAUSEA: 0
CHILLS: 0
FALLS: 0
CONSTIPATION: 1
DIZZINESS: 0
WEIGHT LOSS: 0
FEVER: 0
BRUISES/BLEEDS EASILY: 0
HEMOPTYSIS: 0
NERVOUS/ANXIOUS: 0
COUGH: 0
HEADACHES: 0
BACK PAIN: 0
BLOOD IN STOOL: 0

## 2025-06-05 ASSESSMENT — FIBROSIS 4 INDEX: FIB4 SCORE: 1.66

## 2025-06-11 ENCOUNTER — APPOINTMENT (OUTPATIENT)
Dept: ADMISSIONS | Facility: MEDICAL CENTER | Age: 70
End: 2025-06-11
Attending: SURGERY
Payer: COMMERCIAL

## 2025-06-17 PROCEDURE — RXMED WILLOW AMBULATORY MEDICATION CHARGE: Performed by: PSYCHIATRY & NEUROLOGY

## 2025-06-23 ENCOUNTER — PRE-ADMISSION TESTING (OUTPATIENT)
Dept: ADMISSIONS | Facility: MEDICAL CENTER | Age: 70
End: 2025-06-23
Attending: SURGERY
Payer: COMMERCIAL

## 2025-06-23 RX ORDER — ACETAMINOPHEN 500 MG
500-1000 TABLET ORAL EVERY 6 HOURS PRN
COMMUNITY

## 2025-06-23 RX ORDER — IBUPROFEN 200 MG
200 TABLET ORAL EVERY 6 HOURS PRN
COMMUNITY

## 2025-06-23 NOTE — PREADMIT AVS NOTE
Current Medications   Medication Instructions    ibuprofen (MOTRIN) 200 MG Tab Stop 5 days before surgery    acetaminophen (TYLENOL) 500 MG Tab As needed medication, may take if needed, including morning of procedure     VITAMIN D PO Stop 7 days before surgery    Fremanezumab-vfrm (AJOVY) 225 MG/1.5ML Solution Auto-injector Hold medication day of procedure    Ubrogepant (UBRELVY) 100 MG Tab Hold medication day of procedure    estradiol-norethindrone (COMBIPATCH) 0.05-0.14 MG/DAY patch Hold medication day of procedure    Vortioxetine HBr (TRINTELLIX) 5 MG Tab Continue taking as prescribed.    diazePAM (VALIUM) 5 MG Tab As needed medication, may take if needed, including morning of procedure     valacyclovir (VALTREX) 1 GM Tab Follow instructions from surgeon or specialist.    Prasterone (INTRAROSA) 6.5 MG INSERT Hold medication day of procedure

## 2025-06-24 ENCOUNTER — PHARMACY VISIT (OUTPATIENT)
Dept: PHARMACY | Facility: MEDICAL CENTER | Age: 70
End: 2025-06-24
Payer: COMMERCIAL

## 2025-06-24 RX ORDER — ESTRADIOL/NORETHINDRONE ACETATE TRANSDERMAL SYSTEM .05; .14 MG/D; MG/D
1 PATCH, EXTENDED RELEASE TRANSDERMAL
Qty: 12 PATCH | Refills: 1 | Status: CANCELLED | OUTPATIENT
Start: 2025-03-06

## 2025-07-02 ENCOUNTER — ANESTHESIA EVENT (OUTPATIENT)
Dept: SURGERY | Facility: MEDICAL CENTER | Age: 70
End: 2025-07-02
Payer: COMMERCIAL

## 2025-07-02 ENCOUNTER — HOSPITAL ENCOUNTER (OUTPATIENT)
Facility: MEDICAL CENTER | Age: 70
End: 2025-07-02
Attending: SURGERY | Admitting: SURGERY
Payer: COMMERCIAL

## 2025-07-02 ENCOUNTER — ANESTHESIA (OUTPATIENT)
Dept: SURGERY | Facility: MEDICAL CENTER | Age: 70
End: 2025-07-02
Payer: COMMERCIAL

## 2025-07-02 VITALS
TEMPERATURE: 97.3 F | DIASTOLIC BLOOD PRESSURE: 63 MMHG | HEART RATE: 64 BPM | BODY MASS INDEX: 23.84 KG/M2 | WEIGHT: 147.71 LBS | SYSTOLIC BLOOD PRESSURE: 146 MMHG | RESPIRATION RATE: 16 BRPM | OXYGEN SATURATION: 98 %

## 2025-07-02 DIAGNOSIS — K59.00 CONSTIPATION, UNSPECIFIED CONSTIPATION TYPE: Primary | ICD-10-CM

## 2025-07-02 PROCEDURE — 700105 HCHG RX REV CODE 258: Performed by: SURGERY

## 2025-07-02 PROCEDURE — 160002 HCHG RECOVERY MINUTES (STAT): Performed by: SURGERY

## 2025-07-02 PROCEDURE — 160191 HCHG ANESTHESIA STANDARD: Performed by: SURGERY

## 2025-07-02 PROCEDURE — 160193 HCHG PACU STANDARD - 1ST 60 MINS: Performed by: SURGERY

## 2025-07-02 PROCEDURE — 160048 HCHG OR STATISTICAL LEVEL 1-5: Performed by: SURGERY

## 2025-07-02 PROCEDURE — 700111 HCHG RX REV CODE 636 W/ 250 OVERRIDE (IP): Performed by: ANESTHESIOLOGY

## 2025-07-02 PROCEDURE — 160025 RECOVERY II MINUTES (STATS): Performed by: SURGERY

## 2025-07-02 PROCEDURE — 700101 HCHG RX REV CODE 250: Performed by: ANESTHESIOLOGY

## 2025-07-02 PROCEDURE — 160015 HCHG STAT PREOP MINUTES: Performed by: SURGERY

## 2025-07-02 PROCEDURE — 160046 HCHG PACU - 1ST 60 MINS PHASE II: Performed by: SURGERY

## 2025-07-02 PROCEDURE — 160027 HCHG SURGERY MINUTES - 1ST 30 MINS LEVEL 2: Performed by: SURGERY

## 2025-07-02 RX ORDER — LIDOCAINE HYDROCHLORIDE 20 MG/ML
INJECTION, SOLUTION EPIDURAL; INFILTRATION; INTRACAUDAL; PERINEURAL PRN
Status: DISCONTINUED | OUTPATIENT
Start: 2025-07-02 | End: 2025-07-02 | Stop reason: SURG

## 2025-07-02 RX ORDER — ONDANSETRON 2 MG/ML
4 INJECTION INTRAMUSCULAR; INTRAVENOUS
Status: DISCONTINUED | OUTPATIENT
Start: 2025-07-02 | End: 2025-07-02 | Stop reason: HOSPADM

## 2025-07-02 RX ORDER — SODIUM CHLORIDE, SODIUM LACTATE, POTASSIUM CHLORIDE, CALCIUM CHLORIDE 600; 310; 30; 20 MG/100ML; MG/100ML; MG/100ML; MG/100ML
INJECTION, SOLUTION INTRAVENOUS CONTINUOUS
Status: DISCONTINUED | OUTPATIENT
Start: 2025-07-02 | End: 2025-07-02 | Stop reason: HOSPADM

## 2025-07-02 RX ORDER — DIPHENHYDRAMINE HYDROCHLORIDE 50 MG/ML
12.5 INJECTION, SOLUTION INTRAMUSCULAR; INTRAVENOUS
Status: DISCONTINUED | OUTPATIENT
Start: 2025-07-02 | End: 2025-07-02 | Stop reason: HOSPADM

## 2025-07-02 RX ORDER — ACETAMINOPHEN 500 MG
1000 TABLET ORAL ONCE
Status: DISCONTINUED | OUTPATIENT
Start: 2025-07-02 | End: 2025-07-02 | Stop reason: HOSPADM

## 2025-07-02 RX ORDER — HALOPERIDOL 5 MG/ML
1 INJECTION INTRAMUSCULAR
Status: DISCONTINUED | OUTPATIENT
Start: 2025-07-02 | End: 2025-07-02 | Stop reason: HOSPADM

## 2025-07-02 RX ADMIN — SODIUM CHLORIDE, POTASSIUM CHLORIDE, SODIUM LACTATE AND CALCIUM CHLORIDE: 600; 310; 30; 20 INJECTION, SOLUTION INTRAVENOUS at 11:02

## 2025-07-02 RX ADMIN — PROPOFOL 40 MG: 10 INJECTION, EMULSION INTRAVENOUS at 11:24

## 2025-07-02 RX ADMIN — PROPOFOL 20 MG: 10 INJECTION, EMULSION INTRAVENOUS at 11:26

## 2025-07-02 RX ADMIN — LIDOCAINE HYDROCHLORIDE 20 MG: 20 INJECTION, SOLUTION EPIDURAL; INFILTRATION; INTRACAUDAL; PERINEURAL at 11:24

## 2025-07-02 ASSESSMENT — FIBROSIS 4 INDEX: FIB4 SCORE: 1.66

## 2025-07-02 ASSESSMENT — PAIN DESCRIPTION - PAIN TYPE
TYPE: SURGICAL PAIN
TYPE: SURGICAL PAIN

## 2025-07-02 ASSESSMENT — PAIN SCALES - GENERAL: PAIN_LEVEL: 3

## 2025-07-02 NOTE — ANESTHESIA TIME REPORT
Anesthesia Start and Stop Event Times       Date Time Event    7/2/2025 1118 Ready for Procedure     1118 Anesthesia Start     1137 Anesthesia Stop          Responsible Staff  07/02/25      Name Role Begin End    Sarita Mcfarland M.D. Anesth 1118 1137          Overtime Reason:  no overtime (within assigned shift)    Comments:

## 2025-07-02 NOTE — OR NURSING
Patient recovered well in post-op. AAOx4. VSS, on RA. Surgical sites CDI. Denies pain/nausea. Spouse updated and discussed POC. No belongings in pacu. Report called to [facility]. Patient transported to phase II.

## 2025-07-02 NOTE — PROGRESS NOTES
Pharmacy Medication Reconciliation      ~Medication reconciliation updated and complete per patient   ~Allergies have been verified and updated   ~Is dispense history available in EPIC: yes  ~Patient home pharmacy :  [facility] [phone]      ~Anticoagulants (rivaroxaban, apixaban, edoxaban, dabigatran, warfarin, enoxaparin) taken in the last 14 days? NO     .  Patient took Valtrex 1gm on 6/25/25 TID

## 2025-07-02 NOTE — ANESTHESIA PREPROCEDURE EVALUATION
Case: [id number] Date/Time: 07/02/25 1155    Procedure: FLEXIBLE SIGMOIDOSCOPY    Pre-op diagnosis: ANNORECTAL PAIN    Location: [facility] OR 14 / SURGERY [facility]    Surgeons: Adama Weir M.D.            Relevant Problems   NEURO   (positive) Bilateral occipital neuralgia   (positive) Chronic migraine with aura   (positive) Migraine   (positive) Ocular migraine      CARDIAC   (positive) Chronic migraine with aura   (positive) Hot flashes   (positive) Migraine   (positive) Ocular migraine       Physical Exam    Airway   Mallampati: I  TM distance: >3 FB       Cardiovascular - normal exam  Rhythm: regular     Dental    Pulmonary Breath sounds clear to auscultation     Abdominal    Neurological            Anesthesia Plan    ASA 2       Plan - general       Airway plan will be natural airway          Induction: intravenous      Pertinent diagnostic labs and testing reviewed    Informed Consent:    Anesthetic plan and risks discussed with patient.    Use of blood products discussed with: whom consented to blood products.

## 2025-07-02 NOTE — OR NURSING
1236 Pt arrived to phase II. Reports cramping 3/10 tolerable to mid low abd. VSS. Iv removed. Belongings returned to patient. Pt states readiness for dc. Denies nausea. Dr Wier saw pt in recovery.    1255 discharge instructions reviewed with the patient. All questions answered. No new prescriptions. Cramping tolerable. Pt voided.     Pt ambulated off unit with RN.

## 2025-07-02 NOTE — DISCHARGE INSTRUCTIONS
HOME CARE INSTRUCTIONS    ACTIVITY: Rest and take it easy for the first 24 hours.  A responsible adult is recommended to remain with you during that time.  It is normal to feel sleepy.  We encourage you to not do anything that requires balance, judgment or coordination.    FOR 24 HOURS DO NOT:  Drive, operate machinery or run household appliances.  Drink beer or alcoholic beverages.  Make important decisions or sign legal documents.    Special Instructions     1. Take all of your prescription medications as before but do not use Aspirin or Antiinflammatories  for 24 hours.  2. Make an appointment with your doctor to be seen in 2 weeks from the date of  your procedure.  3. You may feel bloated after the procedure because of air that  was introduced during the examination.  4. Watch carefully for the following symptoms:   Fever or chills.   Abdominal pain which is severe and lasting more than 30-60 minutes- mild cramps  are common for the first 6-12 hours after.   Nausea or vomiting.   Bleeding or black stools.   Any unusual pain or problem.   Pain or redness at the site where the intravenous needle was placed.  If any of these symptoms occur, call your doctor or go to the nearest emergency  department .  6. Rest. During your procedure you were given sedatives that may make you feel tired  7. Do not drink alcohol for 24 hours. Alcohol potentiates the effects of the sedatives given.  The combination of alcohol and the sedatives has an unpredictable effect on your body  that is potentially dangerous to your health.  8. Do not drive or operate machinery until the next day. Driving or operating machinery  takes concentration and the ability to respond rapidly; the sedative adversely affects both.  If you have an engagement that cannot be cancelled, we advise that you have someone  drive you.  9. Do not sign contracts or legal documents until the next day. The sedatives slow down  your body and your mind. Your ability to  objectively evaluate may be impaired.  10. Please contact us if you have an unplanned admission to a hospital within 10 days of  this procedure.  Adama Weir MD PhD      DIET: To avoid nausea, slowly advance diet as tolerated, avoiding spicy or greasy foods for the first day.  Add more substantial food to your diet according to your physician's instructions.  Babies can be fed formula or breast milk as soon as they are hungry.  INCREASE FLUIDS AND FIBER TO AVOID CONSTIPATION.        MEDICATIONS: Resume taking daily medication.  Take prescribed pain medication with food.  If no medication is prescribed, you may take non-aspirin pain medication if needed.  PAIN MEDICATION CAN BE VERY CONSTIPATING.  Take a stool softener or laxative such as senokot, pericolace, or milk of magnesia if needed.     No Prescription given for .       A follow-up appointment should be arranged with your doctor in  [phone] call to schedule.    You should CALL YOUR PHYSICIAN if you develop:  Fever greater than 101 degrees F.  Pain not relieved by medication, or persistent nausea or vomiting.  Excessive bleeding (blood soaking through dressing) or unexpected drainage from the wound.  Extreme redness or swelling around the incision site, drainage of pus or foul smelling drainage.  Inability to urinate or empty your bladder within 8 hours.  Problems with breathing or chest pain.    You should call [phone] if you develop problems with breathing or chest pain.  If you are unable to contact your doctor or surgical center, you should go to the nearest emergency room or urgent care center.  Physician's telephone #:  [phone]     MILD FLU-LIKE SYMPTOMS ARE NORMAL.  YOU MAY EXPERIENCE GENERALIZED MUSCLE ACHES, THROAT IRRITATION, HEADACHE AND/OR SOME NAUSEA.    If any questions arise, call your doctor.  If your doctor is not available, please feel free to call the Surgical Center at [phone].  The Center is open Monday through Friday from  7AM to 7PM.      A registered nurse may call you a few days after your surgery to see how you are doing after your procedure.    You may also receive a survey in the mail within the next two weeks and we ask that you take a few moments to complete the survey and return it to us.  Our goal is to provide you with very good care and we value your comments.     Depression / Suicide Risk    As you are discharged from this [facility] facility, it is important to learn how to keep safe from harming yourself.    Recognize the warning signs:  Abrupt changes in personality, positive or negative- including increase in energy   Giving away possessions  Change in eating patterns- significant weight changes-  positive or negative  Change in sleeping patterns- unable to sleep or sleeping all the time   Unwillingness or inability to communicate  Depression  Unusual sadness, discouragement and loneliness  Talk of wanting to die  Neglect of personal appearance   Rebelliousness- reckless behavior  Withdrawal from people/activities they love  Confusion- inability to concentrate     If you or a loved one observes any of these behaviors or has concerns about self-harm, here's what you can do:  Talk about it- your feelings and reasons for harming yourself  Remove any means that you might use to hurt yourself (examples: pills, rope, extension cords, firearm)  Get professional help from the community (Mental Health, Substance Abuse, psychological counseling)  Do not be alone:Call your Safe Contact- someone whom you trust who will be there for you.  Call your local CRISIS HOTLINE 786-9540 or 770-754-6958  Call your local Children's Mobile Crisis Response Team [place] (362) 962-5441 or www.Bux180  Call the toll free National Suicide Prevention Hotlines   National Suicide Prevention Lifeline 746-973-ORUH (3974)  National Hope Line Network 800-SUICIDE (153-7297)    I acknowledge receipt and understanding of these Home Care  instructions.

## 2025-07-02 NOTE — OP REPORT
Date: 7/2/2025    Surgeon: Adama Weir    Sedation: monitored anesthesia care provided by      Pre-operative Diagnosis:history constipation  Post-operative Diagnosis: Same    Procedure:   Diagnostic flexible sigmoidoscopy    Indications: 70-year-old female with a history of constipation presents for endoscopic evaluation to rule out structural causes.    Findings: Normal flexible sigmoidoscopy to 45 cm.    Procedure in detail:   The procedure, indications, preparation and potential complications were explained to the patient, who indicated understanding and signed the corresponding consent forms.  Monitored anesthesia care was provided by anesthesiologist.  Continuous monitoring was used throughout the procedure and supplemental oxygen was administered.The quality of the preparation was fair and likely inadequate to identify polyps measuring less than 5 mm.  The patient was placed in the left lateral decubitus position.  Digital rectal examination revealed no abnormalities..  The flexible colonoscope was introduced through the rectum and advanced under direct visualization until the colon was reached.  The appendiceal orifice and ileocecal valve were not identified.  The colonoscope was not retroflexed within the rectum.  Careful visualization was performed as the instrument was withdrawn.  Patient tolerates to the procedure was good.  The procedure was not difficult.    Adama Weir MD PhD  [facility]  Colon and Rectal Surgeon  [phone]

## 2025-07-02 NOTE — ANESTHESIA POSTPROCEDURE EVALUATION
Patient: Rosa Walker    Procedure Summary       Date: 07/02/25 Room / Location: [place] [id number] / SURGERY [facility]    Anesthesia Start: 1118 Anesthesia Stop: 1137    Procedure: FLEXIBLE SIGMOIDOSCOPY (Anus) Diagnosis: (CONSTIPATION, EXTERNAL HEMORRHOID)    Surgeons: Adama Weir M.D. Responsible Provider: Sarita Mcfarland M.D.    Anesthesia Type: general ASA Status: 2            Final Anesthesia Type: general  Last vitals  BP   Blood Pressure : (!) 146/63    Temp   36.3 °C (97.3 °F)    Pulse   64   Resp   16    SpO2   98 %      Anesthesia Post Evaluation    Patient location during evaluation: PACU  Patient participation: complete - patient participated  Level of consciousness: awake and alert  Pain score: 3    Airway patency: patent  Anesthetic complications: no  Cardiovascular status: hemodynamically stable  Respiratory status: acceptable  Hydration status: euvolemic    PONV: none        There were no known notable events for this encounter.     Nurse Pain Score: 3 (NPRS)

## 2025-07-16 PROCEDURE — RXMED WILLOW AMBULATORY MEDICATION CHARGE: Performed by: PSYCHIATRY & NEUROLOGY

## 2025-07-17 ENCOUNTER — PHARMACY VISIT (OUTPATIENT)
Dept: PHARMACY | Facility: MEDICAL CENTER | Age: 70
End: 2025-07-17
Payer: COMMERCIAL

## 2025-08-15 ENCOUNTER — SPECIALTY PHARMACY (OUTPATIENT)
Dept: NEUROLOGY | Facility: MEDICAL CENTER | Age: 70
End: 2025-08-15
Payer: COMMERCIAL

## 2025-08-15 PROCEDURE — RXMED WILLOW AMBULATORY MEDICATION CHARGE: Performed by: PSYCHIATRY & NEUROLOGY

## 2025-08-18 PROCEDURE — RXMED WILLOW AMBULATORY MEDICATION CHARGE: Performed by: OBSTETRICS & GYNECOLOGY

## 2025-08-19 ENCOUNTER — PHARMACY VISIT (OUTPATIENT)
Dept: PHARMACY | Facility: MEDICAL CENTER | Age: 70
End: 2025-08-19
Payer: COMMERCIAL

## 2025-10-01 ENCOUNTER — APPOINTMENT (OUTPATIENT)
Dept: RADIOLOGY | Facility: MEDICAL CENTER | Age: 70
End: 2025-10-01
Attending: FAMILY MEDICINE
Payer: COMMERCIAL

## 2025-10-01 DIAGNOSIS — Z12.31 VISIT FOR SCREENING MAMMOGRAM: ICD-10-CM

## (undated) DEVICE — SENSOR OXIMETER ADULT SPO2 RD SET (20EA/BX)

## (undated) DEVICE — GOWN WARMING STANDARD FLEX - (30/CA)

## (undated) DEVICE — SUTURE 4-0 MONOCRYL PLUS PS-2 - 27 INCH (36/BX)

## (undated) DEVICE — CANNULA W/SEAL 5X100 Z-THRE - ADED KII (12/BX)

## (undated) DEVICE — WATER IRRIGATION STERILE 1000ML (12EA/CA)

## (undated) DEVICE — TUBE CONNECTING SUCTION - CLEAR PLASTIC STERILE 72 IN (50EA/CA)

## (undated) DEVICE — SET LEADWIRE 5 LEAD BEDSIDE DISPOSABLE ECG (1SET OF 5/EA)

## (undated) DEVICE — CANISTER SUCTION 3000ML MECHANICAL FILTER AUTO SHUTOFF MEDI-VAC NONSTERILE LF DISP  (40EA/CA)

## (undated) DEVICE — DERMABOND ADVANCED - (12EA/BX)

## (undated) DEVICE — Device

## (undated) DEVICE — TROCAR 5X100 NON BLADED Z-TH - READ KII (6/BX)

## (undated) DEVICE — LIGASURE LAPAROSCOPIC 5MM - (6EA/CA)

## (undated) DEVICE — LACTATED RINGERS INJ 1000 ML - (14EA/CA 60CA/PF)

## (undated) DEVICE — SUCTION INSTRUMENT YANKAUER BULBOUS TIP W/O VENT (50EA/CA)

## (undated) DEVICE — TUBING CLEARLINK DUO-VENT - C-FLO (48EA/CA)

## (undated) DEVICE — NEEDLE INSFL 120MM 14GA VRRS - (20/BX)

## (undated) DEVICE — TOWEL STOP TIMEOUT SAFETY FLAG (40EA/CA)

## (undated) DEVICE — SUTURE GENERAL

## (undated) DEVICE — SLEEVE VASO CALF MED - (10PR/CA)

## (undated) DEVICE — KIT ENDOSCOPIC LIGHT SIGMOIDOSCOPE WITH OBTURATOR SUCTION INSTRUMENT AND TUBING SET 8FR (10EA/CA)

## (undated) DEVICE — GLOVE BIOGEL SZ 6.5 SURGICAL PF LTX (50PR/BX 4BX/CA)

## (undated) DEVICE — CANNULA O2 COMFORT SOFT EAR ADULT 7 FT TUBING (50/CA)

## (undated) DEVICE — MASK OXYGEN VNYL ADLT MED CONC WITH 7 FOOT TUBING  - (50EA/CA)

## (undated) DEVICE — GLOVE BIOGEL INDICATOR SZ 7SURGICAL PF LTX - (50/BX 4BX/CA)

## (undated) DEVICE — SODIUM CHL IRRIGATION 0.9% 1000ML (12EA/CA)

## (undated) DEVICE — KIT  I.V. START (100EA/CA)

## (undated) DEVICE — PAD SANITARY 11IN MAXI IND WRAPPED  (12EA/PK 24PK/CA)

## (undated) DEVICE — KIT CUSTOM PROCEDURE SINGLE FOR ENDO (15/CA)

## (undated) DEVICE — CANISTER SUCTION RIGID RED 1500CC (40EA/CA)